# Patient Record
Sex: FEMALE | Race: BLACK OR AFRICAN AMERICAN | NOT HISPANIC OR LATINO | URBAN - METROPOLITAN AREA
[De-identification: names, ages, dates, MRNs, and addresses within clinical notes are randomized per-mention and may not be internally consistent; named-entity substitution may affect disease eponyms.]

---

## 2018-04-01 ENCOUNTER — OUTPATIENT (OUTPATIENT)
Dept: OUTPATIENT SERVICES | Facility: HOSPITAL | Age: 38
LOS: 1 days | End: 2018-04-01
Payer: MEDICAID

## 2018-04-01 PROCEDURE — G9001: CPT

## 2018-04-12 ENCOUNTER — EMERGENCY (EMERGENCY)
Facility: HOSPITAL | Age: 38
LOS: 1 days | Discharge: HOME | End: 2018-04-12
Attending: PERSONAL EMERGENCY RESPONSE ATTENDANT | Admitting: PERSONAL EMERGENCY RESPONSE ATTENDANT
Payer: MEDICAID

## 2018-04-12 VITALS
OXYGEN SATURATION: 100 % | TEMPERATURE: 98 F | HEART RATE: 86 BPM | RESPIRATION RATE: 18 BRPM | DIASTOLIC BLOOD PRESSURE: 90 MMHG | WEIGHT: 195.11 LBS | SYSTOLIC BLOOD PRESSURE: 133 MMHG

## 2018-04-12 PROCEDURE — 99283 EMERGENCY DEPT VISIT LOW MDM: CPT | Mod: 25

## 2018-04-12 NOTE — ED ADULT NURSE NOTE - OBJECTIVE STATEMENT
pt c/o intermittent rt knee pain x 2 months- no injury, also mild rt mid back pain. all is worse with weight bearing. mild tenderness to rt lateral knee

## 2018-04-13 DIAGNOSIS — R69 ILLNESS, UNSPECIFIED: ICD-10-CM

## 2018-04-13 PROBLEM — Z00.00 ENCOUNTER FOR PREVENTIVE HEALTH EXAMINATION: Status: ACTIVE | Noted: 2018-04-13

## 2018-04-13 PROCEDURE — 73564 X-RAY EXAM KNEE 4 OR MORE: CPT | Mod: 26,RT

## 2018-04-13 PROCEDURE — 99283 EMERGENCY DEPT VISIT LOW MDM: CPT

## 2018-04-13 PROCEDURE — 73564 X-RAY EXAM KNEE 4 OR MORE: CPT

## 2018-04-13 RX ORDER — IBUPROFEN 200 MG
600 TABLET ORAL ONCE
Qty: 0 | Refills: 0 | Status: COMPLETED | OUTPATIENT
Start: 2018-04-13 | End: 2018-04-13

## 2018-04-13 RX ADMIN — Medication 600 MILLIGRAM(S): at 01:11

## 2018-04-13 NOTE — ED PROCEDURE NOTE - ATTENDING CONTRIBUTION TO CARE
I supervised this procedure.  Knee immobilizer fitted for comfort.  Remains nvsc intact distal to site following placement.  Offered crutches but able to comfortably ambulate without crutches.

## 2018-04-13 NOTE — ED PROVIDER NOTE - ATTENDING CONTRIBUTION TO CARE
Attending PT is a 37 yr old female with complaitn of intermittent R knee pain x 2 mos. She states that pain is worsening and makes weight-bearing uncomfortbale.  NO acute injury.  No acute trauma.  NO inciting event.  No orthopedist currently. PT took naproxen at 3 pm today.  R knee tender over lateral joint line.  Unable to range 2/2 pain.  L knee unremarkable.  R knee with mild laxity on valgus/varus stress.  PT endorses some mild R lumbar back pain as well.  NO midline spinal TTP, no stepoffs.

## 2018-04-13 NOTE — ED PROVIDER NOTE - PLAN OF CARE
1. Follow up with ortho this week.  Referral list provided.   2. Rest, ice and elevate knee.  Ambulate as tolerated with use of crutches.  Keep knee immobilizer on during the day.  Take Motrin 600mg every 8 hrs for pain with food.  3. Worsening pain, swelling, weakness, numbness return to ER

## 2018-04-13 NOTE — ED PROVIDER NOTE - OBJECTIVE STATEMENT
36 Yo female no Pmhx presents to ED c/o right knee pain intermittent x 1 year. Pt notes worsening right knee pain in the past week, with inability to bear weight. Has tried naproxen, last took at 3pm today with minor relief. Endorses right lumbar back pain as well with. Denies trauma,  fever, erythema, swelling. Does not have orthopedist.

## 2018-04-13 NOTE — ED PROVIDER NOTE - PROGRESS NOTE DETAILS
Attending MD Charles.  Pt evaluated in ED.  MIld joint laxity on varus/valgus stress.  ED attending review (wet read entered) without joint injury/bony injury. No acute injury. No effusion.  Remains nvsc intact distal to site.  Able to ambulate without crutches with knee immobilizer in place.  Pt given information for follow-up with orthopedics for outpt management.  Verbalizes understanding of recommendations to utilize tylenol/motrin, ice, rest, elevation and knee immobilizer for additional comfort.  Return to ED for fevers/chills/worsened pain/knee swelling or erythema.

## 2018-04-13 NOTE — ED PROVIDER NOTE - LOWER EXTREMITY EXAM, RIGHT
no bruising, clicks, crepitus, deformity or effusion, ligmaentous instability, swelling noted on exam. TTP lateral joint line. unable to range knee fully secondary to pain.

## 2018-07-30 ENCOUNTER — EMERGENCY (EMERGENCY)
Facility: HOSPITAL | Age: 38
LOS: 0 days | Discharge: HOME | End: 2018-07-31
Attending: EMERGENCY MEDICINE
Payer: COMMERCIAL

## 2018-07-30 VITALS
OXYGEN SATURATION: 100 % | SYSTOLIC BLOOD PRESSURE: 141 MMHG | HEIGHT: 66 IN | WEIGHT: 199.96 LBS | HEART RATE: 86 BPM | TEMPERATURE: 99 F | DIASTOLIC BLOOD PRESSURE: 93 MMHG | RESPIRATION RATE: 18 BRPM

## 2018-07-30 DIAGNOSIS — M25.561 PAIN IN RIGHT KNEE: ICD-10-CM

## 2018-07-30 DIAGNOSIS — R42 DIZZINESS AND GIDDINESS: ICD-10-CM

## 2018-07-30 DIAGNOSIS — S83.91XA SPRAIN OF UNSPECIFIED SITE OF RIGHT KNEE, INITIAL ENCOUNTER: ICD-10-CM

## 2018-07-30 DIAGNOSIS — R51 HEADACHE: ICD-10-CM

## 2018-07-30 PROCEDURE — 99284 EMERGENCY DEPT VISIT MOD MDM: CPT

## 2018-07-30 RX ORDER — KETOROLAC TROMETHAMINE 30 MG/ML
30 SYRINGE (ML) INJECTION ONCE
Qty: 0 | Refills: 0 | Status: DISCONTINUED | OUTPATIENT
Start: 2018-07-30 | End: 2018-07-30

## 2018-07-30 NOTE — ED PROVIDER NOTE - OBJECTIVE STATEMENT
37 year old female with PMH of pinched nerve presenting to ED due to R knee pain on and off x 1 month and states she feels dizzy with headache on and off as well. Denies any focal weakness/numbness. Dizziness is not like a vertigo.

## 2018-07-30 NOTE — ED ADULT NURSE NOTE - OBJECTIVE STATEMENT
pt a&o x3, pt c/o of ongoing right knee pain 10/10. Pain worsening on extension and standing, pain described as tightness/numbness. Ice applied at this time by this nurse and leg elevated. Slight edema to right knee in comparison to left knee. PMH pinched nerve and bulging disc in back.

## 2018-07-30 NOTE — ED PROVIDER NOTE - MUSCULOSKELETAL, MLM
Spine appears normal, range of motion is not limited, R knee ROM intact, mild tenderness mediosuperior aspect

## 2018-07-30 NOTE — ED PROVIDER NOTE - NEUROLOGICAL, MLM
Alert and oriented, no focal deficits, no motor or sensory deficits. CN II - XII intact, no deficits

## 2018-07-30 NOTE — ED PROVIDER NOTE - MEDICAL DECISION MAKING DETAILS
pt with dizziness noted, headache on and off may follow up neuro if symptoms continue, knee pain x 1 month to dc with ortho follow up.

## 2018-07-30 NOTE — ED ADULT NURSE NOTE - NS ED NOTE ABUSE SUSPICION NEGLECT YN
[facility]    [id number]    [facility] [address]    Phone:  [phone]    Fax:  [phone]       Thank You for choosing us for your health care visit. We are glad to serve you and happy to provide you with this summary of your visit. Please help us to ensure we have accurate records. If you find anything that needs to be changed, please let our staff know as soon as possible.          Your Demographic Information     Patient Name Sex Gretta Rowley Female 1964       Ethnic Group Patient Race    Not of  or  Origin White      Your Visit Details     Date & Time Provider Department    2017 3:40 PM MIKEY Marino [facility] Med      We Ordered or Performed the Following     AEROSOL INHALATION ACUTE       Conditions Discussed Today or Order-Related Diagnoses        Comments    Bronchitis    -  Primary     Wheezing symptom           Your Vitals Were     BP Pulse Temp Resp Height Weight    148/54 (BP Location: RUE, Patient Position: Sitting, Cuff Size: Regular) 84 99.7 °F (37.6 °C) 20 5' 4\" (1.626 m) 186 lb 14.4 oz (84.8 kg)    SpO2 BMI Smoking Status             90% 32.08 kg/m2 Former Smoker         Medications Prescribed or Re-Ordered Today     predniSONE (DELTASONE) 20 MG tablet    Sig - Route: Take 2 tablets by mouth daily. - Oral    Class: Eprescribe    Pharmacy: [facility] [address] [place] [address] [address]  [address] Ph #: [phone]    azithromycin (ZITHROMAX Z-STANLEY) 250 MG tablet    Si tablets day one, then 1 tablet days 2-5    Class: Eprescribe    Pharmacy: [facility] [address] [place] [address] [address]  AT [address] [facility] Ph #: [phone]      Your Current Medications Are        Disp Refills Start End    BELATACEPT IV        Sig - Route: Indications: done monthly at [facility] - Intravenous    Class: Historical Med    mycophenolate (MYFORTIC)  360 MG DR tablet        Sig - Route: Take 720 mg by mouth 2 times daily. 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    Class: Eprescribe    insulin glargine (LANTUS) 100 UNIT/ML injection 10 mL 12 11/3/2016     Sig - Route: Inject 18 Units into the skin daily. - Subcutaneous    Class: Eprescribe    Notes to Pharmacy: Due for office visit and labs.    atorvastatin (LIPITOR) 80 MG tablet 45 tablet 4 2016     Sig: One-half tablet daily    Class: Eprescribe    aspirin (ECOTRIN) 81 MG EC tablet        Sig - Route: Take 81 mg by mouth daily. - Oral    Class: Historical Med    furosemide (LASIX) 80 MG tablet        Sig - Route: Take 80 mg by mouth 2 times daily. - Oral    Class: Historical Med    calcitRIOL (ROCALTROL) 0.25 MCG capsule        Sig - Route: Take 0.25 mcg by mouth every other day. Takes with supper - Oral    Class: Historical Med    cholecalciferol (VITAMIN D3) 1000 UNITS tablet        Sig - Route: Take 2,000 Units by mouth daily. - Oral    Class: Historical Med    docusate sodium (COLACE) 100 MG capsule        Sig - Route: Take 200 mg by mouth daily before dinner. - Oral    Class: Historical Med    nitroGLYcerin (NITROSTAT) 0.4 MG SL tablet        Sig - Route: Place 0.4 mg under the tongue every 5 minutes as needed. - Sublingual    Class: Historical Med    acetaminophen (TYLENOL) 325 MG tablet        Sig - Route: Take 650 mg by mouth every 4 hours as needed. Indications: Fever, Pain - Oral    Class: Historical Med    predniSONE (DELTASONE) 20 MG tablet 8 tablet 0 2017     Sig - Route: Take 2 tablets by mouth daily. - Oral    Class: Eprescribe    azithromycin (ZITHROMAX Z-STANLEY) 250 MG tablet 6 tablet 0 2017    Si tablets day one, then 1 tablet days 2-5     Class: Eprescribe    Iron-Vitamin C (VITRON-C)  MG TABS        Sig - Route: Take 2 tablets by mouth daily. Indications: iron - Oral    Class: Historical Med      Discontinued Medications        Reason for Discontinue    atropine (ISOPTO ATROPINE) 1 % ophthalmic solution Therapy Completed    gentamicin (GARAMYCIN) 0.1 % cream Therapy Completed    HYDROcodone-acetaminophen (NORCO) 5-325 MG per tablet Therapy Completed    moxifloxacin (VIGAMOX) 0.5 % ophthalmic solution Therapy Completed    Ophthalmic Irrigation Solution (RA STERILE EYE WASH) SOLN Therapy Completed    PERITONEAL DIALYSIS SOLUTIONS IP Therapy Completed    prednisoLONE acetate (PRED FORTE) 1 % ophthalmic suspension Therapy Completed    pregabalin (LYRICA) 25 MG capsule Therapy Completed    prochlorperazine (COMPAZINE) 25 MG suppository Therapy Completed    sevelamer carbonate (RENVELA) 800 MG tablet Therapy Completed      Allergies     Tape [Adhesive] HIVES, RASH    Able to use paper tape    Augmentin [Amoclan] HIVES, RASH      Immunizations History as of 4/6/2017     Name Date    Influenza 11/9/2016, 10/20/2014, 10/30/2013, 10/4/2011, 10/7/2010, 9/30/2009, 10/30/2008, 10/30/2007, 10/25/2006, 11/15/2005, 11/4/2004, 11/6/2003, 11/26/2001    MMR 4/2/1979    Pneumococcal Polysaccharide Adult 11/26/2001      Medications Administered Today        Admin Date Action Route                albuterol-ipratropium 2.5 mg/0.5 mg (DUONEB) nebulizer solution 3 mL 04/06/2017 Given Nebulization                 Patient Portal Signup     Manage health care for you and your family anytime, anywhere with the new [facility], your free online resource for quick and easy access to personal health information, scheduling appointments, refilling prescriptions, viewing test results, paying bills and more.  Sign up for a free and secure account. Please follow the instructions below to securely complete your enrollment.     1. Go to [email or web address]  2. Click Sign  Up Now   3. Enter the Activation Code when prompted     Activation Code: [email or web address]  Expires: 5/6/2017  4:22 PM    If you have questions related to [facility], you can email [email or web address] or call [phone] to talk to our [facility] staff.  For questions related to your health, contact your physician’s office.  Please remember to dial 911 for medical emergencies.              Patient Instructions     None       No

## 2018-07-31 VITALS
SYSTOLIC BLOOD PRESSURE: 120 MMHG | HEART RATE: 76 BPM | OXYGEN SATURATION: 99 % | DIASTOLIC BLOOD PRESSURE: 68 MMHG | RESPIRATION RATE: 19 BRPM | TEMPERATURE: 98 F

## 2018-07-31 LAB
ALBUMIN SERPL ELPH-MCNC: 3.5 G/DL — SIGNIFICANT CHANGE UP (ref 3.3–5)
ALP SERPL-CCNC: 62 U/L — SIGNIFICANT CHANGE UP (ref 40–120)
ALT FLD-CCNC: 50 U/L — SIGNIFICANT CHANGE UP (ref 12–78)
ANION GAP SERPL CALC-SCNC: 9 MMOL/L — SIGNIFICANT CHANGE UP (ref 5–17)
AST SERPL-CCNC: 29 U/L — SIGNIFICANT CHANGE UP (ref 15–37)
BASOPHILS # BLD AUTO: 0.03 K/UL — SIGNIFICANT CHANGE UP (ref 0–0.2)
BASOPHILS NFR BLD AUTO: 0.3 % — SIGNIFICANT CHANGE UP (ref 0–2)
BILIRUB SERPL-MCNC: 0.7 MG/DL — SIGNIFICANT CHANGE UP (ref 0.2–1.2)
BUN SERPL-MCNC: 17 MG/DL — SIGNIFICANT CHANGE UP (ref 7–23)
CALCIUM SERPL-MCNC: 8.5 MG/DL — SIGNIFICANT CHANGE UP (ref 8.5–10.1)
CHLORIDE SERPL-SCNC: 104 MMOL/L — SIGNIFICANT CHANGE UP (ref 96–108)
CO2 SERPL-SCNC: 27 MMOL/L — SIGNIFICANT CHANGE UP (ref 22–31)
CREAT SERPL-MCNC: 0.74 MG/DL — SIGNIFICANT CHANGE UP (ref 0.5–1.3)
EOSINOPHIL # BLD AUTO: 0.11 K/UL — SIGNIFICANT CHANGE UP (ref 0–0.5)
EOSINOPHIL NFR BLD AUTO: 1 % — SIGNIFICANT CHANGE UP (ref 0–6)
GLUCOSE SERPL-MCNC: 96 MG/DL — SIGNIFICANT CHANGE UP (ref 70–99)
HCG SERPL-ACNC: <1 MIU/ML — SIGNIFICANT CHANGE UP
HCT VFR BLD CALC: 34.8 % — SIGNIFICANT CHANGE UP (ref 34.5–45)
HGB BLD-MCNC: 11.2 G/DL — LOW (ref 11.5–15.5)
IMM GRANULOCYTES NFR BLD AUTO: 0.3 % — SIGNIFICANT CHANGE UP (ref 0–1.5)
LYMPHOCYTES # BLD AUTO: 3.39 K/UL — HIGH (ref 1–3.3)
LYMPHOCYTES # BLD AUTO: 31 % — SIGNIFICANT CHANGE UP (ref 13–44)
MCHC RBC-ENTMCNC: 28.9 PG — SIGNIFICANT CHANGE UP (ref 27–34)
MCHC RBC-ENTMCNC: 32.2 GM/DL — SIGNIFICANT CHANGE UP (ref 32–36)
MCV RBC AUTO: 89.9 FL — SIGNIFICANT CHANGE UP (ref 80–100)
MONOCYTES # BLD AUTO: 0.93 K/UL — HIGH (ref 0–0.9)
MONOCYTES NFR BLD AUTO: 8.5 % — SIGNIFICANT CHANGE UP (ref 2–14)
NEUTROPHILS # BLD AUTO: 6.44 K/UL — SIGNIFICANT CHANGE UP (ref 1.8–7.4)
NEUTROPHILS NFR BLD AUTO: 58.9 % — SIGNIFICANT CHANGE UP (ref 43–77)
NRBC # BLD: 0 /100 WBCS — SIGNIFICANT CHANGE UP (ref 0–0)
PLATELET # BLD AUTO: 341 K/UL — SIGNIFICANT CHANGE UP (ref 150–400)
POTASSIUM SERPL-MCNC: 4.3 MMOL/L — SIGNIFICANT CHANGE UP (ref 3.5–5.3)
POTASSIUM SERPL-SCNC: 4.3 MMOL/L — SIGNIFICANT CHANGE UP (ref 3.5–5.3)
PROT SERPL-MCNC: 7.7 GM/DL — SIGNIFICANT CHANGE UP (ref 6–8.3)
RBC # BLD: 3.87 M/UL — SIGNIFICANT CHANGE UP (ref 3.8–5.2)
RBC # FLD: 14.3 % — SIGNIFICANT CHANGE UP (ref 10.3–14.5)
SODIUM SERPL-SCNC: 140 MMOL/L — SIGNIFICANT CHANGE UP (ref 135–145)
WBC # BLD: 10.93 K/UL — HIGH (ref 3.8–10.5)
WBC # FLD AUTO: 10.93 K/UL — HIGH (ref 3.8–10.5)

## 2018-07-31 PROCEDURE — 70450 CT HEAD/BRAIN W/O DYE: CPT | Mod: 26

## 2018-07-31 PROCEDURE — 73564 X-RAY EXAM KNEE 4 OR MORE: CPT | Mod: 26,RT

## 2018-07-31 RX ADMIN — Medication 30 MILLIGRAM(S): at 00:05

## 2018-08-20 PROBLEM — M51.26 OTHER INTERVERTEBRAL DISC DISPLACEMENT, LUMBAR REGION: Chronic | Status: ACTIVE | Noted: 2018-07-30

## 2018-08-20 PROBLEM — G58.9 MONONEUROPATHY, UNSPECIFIED: Chronic | Status: ACTIVE | Noted: 2018-07-30

## 2018-08-24 ENCOUNTER — APPOINTMENT (OUTPATIENT)
Dept: ORTHOPEDIC SURGERY | Facility: CLINIC | Age: 38
End: 2018-08-24

## 2019-03-01 NOTE — ED PROVIDER NOTE - CARE PLAN
Principal Discharge DX:	Right knee pain  Assessment and plan of treatment:	1. Follow up with ortho this week.  Referral list provided.   2. Rest, ice and elevate knee.  Ambulate as tolerated with use of crutches.  Keep knee immobilizer on during the day.  Take Motrin 600mg every 8 hrs for pain with food.  3. Worsening pain, swelling, weakness, numbness return to ER
no

## 2019-05-17 ENCOUNTER — EMERGENCY (EMERGENCY)
Facility: HOSPITAL | Age: 39
LOS: 1 days | End: 2019-05-17
Attending: EMERGENCY MEDICINE
Payer: MEDICAID

## 2019-05-17 VITALS
HEIGHT: 66 IN | TEMPERATURE: 98 F | WEIGHT: 199.96 LBS | OXYGEN SATURATION: 100 % | SYSTOLIC BLOOD PRESSURE: 136 MMHG | HEART RATE: 82 BPM | RESPIRATION RATE: 18 BRPM | DIASTOLIC BLOOD PRESSURE: 84 MMHG

## 2019-05-17 VITALS
SYSTOLIC BLOOD PRESSURE: 106 MMHG | HEART RATE: 79 BPM | OXYGEN SATURATION: 98 % | TEMPERATURE: 99 F | DIASTOLIC BLOOD PRESSURE: 73 MMHG | RESPIRATION RATE: 17 BRPM

## 2019-05-17 LAB
ALBUMIN SERPL ELPH-MCNC: 4.1 G/DL — SIGNIFICANT CHANGE UP (ref 3.3–5)
ALP SERPL-CCNC: 53 U/L — SIGNIFICANT CHANGE UP (ref 40–120)
ALT FLD-CCNC: 15 U/L — SIGNIFICANT CHANGE UP (ref 10–45)
ANION GAP SERPL CALC-SCNC: 11 MMOL/L — SIGNIFICANT CHANGE UP (ref 5–17)
APPEARANCE UR: ABNORMAL
AST SERPL-CCNC: 16 U/L — SIGNIFICANT CHANGE UP (ref 10–40)
BACTERIA # UR AUTO: ABNORMAL
BASOPHILS # BLD AUTO: 0.1 K/UL — SIGNIFICANT CHANGE UP (ref 0–0.2)
BASOPHILS NFR BLD AUTO: 0.5 % — SIGNIFICANT CHANGE UP (ref 0–2)
BILIRUB SERPL-MCNC: 0.6 MG/DL — SIGNIFICANT CHANGE UP (ref 0.2–1.2)
BILIRUB UR-MCNC: NEGATIVE — SIGNIFICANT CHANGE UP
BUN SERPL-MCNC: 13 MG/DL — SIGNIFICANT CHANGE UP (ref 7–23)
CALCIUM SERPL-MCNC: 9.2 MG/DL — SIGNIFICANT CHANGE UP (ref 8.4–10.5)
CHLORIDE SERPL-SCNC: 102 MMOL/L — SIGNIFICANT CHANGE UP (ref 96–108)
CO2 SERPL-SCNC: 27 MMOL/L — SIGNIFICANT CHANGE UP (ref 22–31)
COLOR SPEC: ABNORMAL
CREAT SERPL-MCNC: 0.77 MG/DL — SIGNIFICANT CHANGE UP (ref 0.5–1.3)
DIFF PNL FLD: NEGATIVE — SIGNIFICANT CHANGE UP
EOSINOPHIL # BLD AUTO: 0.2 K/UL — SIGNIFICANT CHANGE UP (ref 0–0.5)
EOSINOPHIL NFR BLD AUTO: 1.5 % — SIGNIFICANT CHANGE UP (ref 0–6)
EPI CELLS # UR: 32 /HPF — HIGH
GLUCOSE SERPL-MCNC: 108 MG/DL — HIGH (ref 70–99)
GLUCOSE UR QL: NEGATIVE — SIGNIFICANT CHANGE UP
HCG SERPL-ACNC: <2 MIU/ML — SIGNIFICANT CHANGE UP
HCT VFR BLD CALC: 35.7 % — SIGNIFICANT CHANGE UP (ref 34.5–45)
HGB BLD-MCNC: 12 G/DL — SIGNIFICANT CHANGE UP (ref 11.5–15.5)
HYALINE CASTS # UR AUTO: 6 /LPF — HIGH (ref 0–2)
KETONES UR-MCNC: NEGATIVE — SIGNIFICANT CHANGE UP
LEUKOCYTE ESTERASE UR-ACNC: ABNORMAL
LYMPHOCYTES # BLD AUTO: 3.6 K/UL — HIGH (ref 1–3.3)
LYMPHOCYTES # BLD AUTO: 34.4 % — SIGNIFICANT CHANGE UP (ref 13–44)
MCHC RBC-ENTMCNC: 30.9 PG — SIGNIFICANT CHANGE UP (ref 27–34)
MCHC RBC-ENTMCNC: 33.6 GM/DL — SIGNIFICANT CHANGE UP (ref 32–36)
MCV RBC AUTO: 91.8 FL — SIGNIFICANT CHANGE UP (ref 80–100)
MONOCYTES # BLD AUTO: 1 K/UL — HIGH (ref 0–0.9)
MONOCYTES NFR BLD AUTO: 9.9 % — SIGNIFICANT CHANGE UP (ref 2–14)
NEUTROPHILS # BLD AUTO: 5.5 K/UL — SIGNIFICANT CHANGE UP (ref 1.8–7.4)
NEUTROPHILS NFR BLD AUTO: 53.6 % — SIGNIFICANT CHANGE UP (ref 43–77)
NITRITE UR-MCNC: NEGATIVE — SIGNIFICANT CHANGE UP
PH UR: 7 — SIGNIFICANT CHANGE UP (ref 5–8)
PLATELET # BLD AUTO: 343 K/UL — SIGNIFICANT CHANGE UP (ref 150–400)
POTASSIUM SERPL-MCNC: 4.2 MMOL/L — SIGNIFICANT CHANGE UP (ref 3.5–5.3)
POTASSIUM SERPL-SCNC: 4.2 MMOL/L — SIGNIFICANT CHANGE UP (ref 3.5–5.3)
PROT SERPL-MCNC: 7.4 G/DL — SIGNIFICANT CHANGE UP (ref 6–8.3)
PROT UR-MCNC: ABNORMAL
RBC # BLD: 3.88 M/UL — SIGNIFICANT CHANGE UP (ref 3.8–5.2)
RBC # FLD: 12.3 % — SIGNIFICANT CHANGE UP (ref 10.3–14.5)
RBC CASTS # UR COMP ASSIST: 3 /HPF — SIGNIFICANT CHANGE UP (ref 0–4)
SODIUM SERPL-SCNC: 140 MMOL/L — SIGNIFICANT CHANGE UP (ref 135–145)
SP GR SPEC: 1.02 — SIGNIFICANT CHANGE UP (ref 1.01–1.02)
TSH SERPL-MCNC: 2.56 UIU/ML — SIGNIFICANT CHANGE UP (ref 0.27–4.2)
UROBILINOGEN FLD QL: ABNORMAL
WBC # BLD: 10.3 K/UL — SIGNIFICANT CHANGE UP (ref 3.8–10.5)
WBC # FLD AUTO: 10.3 K/UL — SIGNIFICANT CHANGE UP (ref 3.8–10.5)
WBC UR QL: 57 /HPF — HIGH (ref 0–5)

## 2019-05-17 PROCEDURE — 84702 CHORIONIC GONADOTROPIN TEST: CPT

## 2019-05-17 PROCEDURE — 70450 CT HEAD/BRAIN W/O DYE: CPT | Mod: 26

## 2019-05-17 PROCEDURE — 96366 THER/PROPH/DIAG IV INF ADDON: CPT

## 2019-05-17 PROCEDURE — 70553 MRI BRAIN STEM W/O & W/DYE: CPT

## 2019-05-17 PROCEDURE — 96365 THER/PROPH/DIAG IV INF INIT: CPT | Mod: XU

## 2019-05-17 PROCEDURE — 72156 MRI NECK SPINE W/O & W/DYE: CPT | Mod: 26

## 2019-05-17 PROCEDURE — 99284 EMERGENCY DEPT VISIT MOD MDM: CPT | Mod: 25

## 2019-05-17 PROCEDURE — 99236 HOSP IP/OBS SAME DATE HI 85: CPT

## 2019-05-17 PROCEDURE — 96375 TX/PRO/DX INJ NEW DRUG ADDON: CPT

## 2019-05-17 PROCEDURE — 81001 URINALYSIS AUTO W/SCOPE: CPT

## 2019-05-17 PROCEDURE — 84443 ASSAY THYROID STIM HORMONE: CPT

## 2019-05-17 PROCEDURE — G0378: CPT

## 2019-05-17 PROCEDURE — 72156 MRI NECK SPINE W/O & W/DYE: CPT

## 2019-05-17 PROCEDURE — 80307 DRUG TEST PRSMV CHEM ANLYZR: CPT

## 2019-05-17 PROCEDURE — 80053 COMPREHEN METABOLIC PANEL: CPT

## 2019-05-17 PROCEDURE — 85027 COMPLETE CBC AUTOMATED: CPT

## 2019-05-17 PROCEDURE — 99283 EMERGENCY DEPT VISIT LOW MDM: CPT

## 2019-05-17 PROCEDURE — 87086 URINE CULTURE/COLONY COUNT: CPT

## 2019-05-17 PROCEDURE — 70553 MRI BRAIN STEM W/O & W/DYE: CPT | Mod: 26

## 2019-05-17 PROCEDURE — A9585: CPT

## 2019-05-17 PROCEDURE — 70450 CT HEAD/BRAIN W/O DYE: CPT

## 2019-05-17 RX ORDER — METOCLOPRAMIDE HCL 10 MG
10 TABLET ORAL ONCE
Refills: 0 | Status: COMPLETED | OUTPATIENT
Start: 2019-05-17 | End: 2019-05-17

## 2019-05-17 RX ORDER — NITROFURANTOIN MACROCRYSTAL 50 MG
100 CAPSULE ORAL
Refills: 0 | Status: DISCONTINUED | OUTPATIENT
Start: 2019-05-17 | End: 2019-05-17

## 2019-05-17 RX ORDER — SODIUM CHLORIDE 9 MG/ML
1000 INJECTION INTRAMUSCULAR; INTRAVENOUS; SUBCUTANEOUS ONCE
Refills: 0 | Status: COMPLETED | OUTPATIENT
Start: 2019-05-17 | End: 2019-05-17

## 2019-05-17 RX ORDER — KETOROLAC TROMETHAMINE 30 MG/ML
15 SYRINGE (ML) INJECTION ONCE
Refills: 0 | Status: DISCONTINUED | OUTPATIENT
Start: 2019-05-17 | End: 2019-05-17

## 2019-05-17 RX ORDER — NITROFURANTOIN MACROCRYSTAL 50 MG
1 CAPSULE ORAL
Qty: 20 | Refills: 0
Start: 2019-05-17 | End: 2019-05-26

## 2019-05-17 RX ORDER — CEFTRIAXONE 500 MG/1
1 INJECTION, POWDER, FOR SOLUTION INTRAMUSCULAR; INTRAVENOUS ONCE
Refills: 0 | Status: COMPLETED | OUTPATIENT
Start: 2019-05-17 | End: 2019-05-17

## 2019-05-17 RX ADMIN — CEFTRIAXONE 1 GRAM(S): 500 INJECTION, POWDER, FOR SOLUTION INTRAMUSCULAR; INTRAVENOUS at 06:46

## 2019-05-17 RX ADMIN — Medication 15 MILLIGRAM(S): at 02:41

## 2019-05-17 RX ADMIN — Medication 10 MILLIGRAM(S): at 02:05

## 2019-05-17 RX ADMIN — CEFTRIAXONE 100 GRAM(S): 500 INJECTION, POWDER, FOR SOLUTION INTRAMUSCULAR; INTRAVENOUS at 03:47

## 2019-05-17 RX ADMIN — SODIUM CHLORIDE 1000 MILLILITER(S): 9 INJECTION INTRAMUSCULAR; INTRAVENOUS; SUBCUTANEOUS at 02:05

## 2019-05-17 NOTE — CONSULT NOTE ADULT - SUBJECTIVE AND OBJECTIVE BOX
Neurology  Consult Note  05-17-19    Name:  VIRGINIA GUZMÁN; 38y (1980)    Reason for Admission:     Chief Complaint:  Right hemisensory changes    HPI:  39yo AA woman with a PMHx of lumbar disc herniations, presents with complaints of sensory changes. Saturday 5/11, patient reports numbness of LUE. Lasting <1hr. Resolved. Sunday, patient states the numbness now present over RUE and RLE. Describes the sensory change as occasionally numbness vs. paresthesia. States episodes last <1hr, but can occur >10x/ day. Nothing makes it better or worse. Patient describes some weakness with the sensory loss, stating that its difficult to hold thing in the affected hand during the episode. Patient currently reports that she is at baseline. Patient denies fevers, chills, ns, cp, sob, abd pain, n/v/d/c.  In the ED, VSS, labs grossly wnl, CTH shows NAD.     Review of Systems:  As states in HPI.      PMHx:   Bulge of lumbar disc without myelopathy  Pinched nerve  Anal Fissure    PFHx:   No pertinent family history in first degree relatives    PSuHx:   No significant past surgical history    Medications:  MEDICATIONS  (STANDING):    MEDICATIONS  (PRN):    Vitals:  T(C): 36.4 (05-17-19 @ 00:54), Max: 36.4 (05-17-19 @ 00:54)  HR: 75 (05-17-19 @ 06:22) (75 - 82)  BP: 126/90 (05-17-19 @ 06:22) (126/90 - 136/84)  RR: 18 (05-17-19 @ 06:22) (18 - 18)  SpO2: 100% (05-17-19 @ 06:22) (100% - 100%)    Labs:                        12.0   10.3  )-----------( 343      ( 17 May 2019 02:01 )             35.7     05-17    140  |  102  |  13  ----------------------------<  108<H>  4.2   |  27  |  0.77    Ca    9.2      17 May 2019 02:01    TPro  7.4  /  Alb  4.1  /  TBili  0.6  /  DBili  x   /  AST  16  /  ALT  15  /  AlkPhos  53  05-17    CAPILLARY BLOOD GLUCOSE  LIVER FUNCTIONS - ( 17 May 2019 02:01 )  Alb: 4.1 g/dL / Pro: 7.4 g/dL / ALK PHOS: 53 U/L / ALT: 15 U/L / AST: 16 U/L / GGT: x           PHYSICAL EXAMINATION:  General: Well-developed, well nourished, in no acute distress.  Eyes: Conjunctiva and sclera clear.  Neurologic:  - Mental Status:  Alert, awake, oriented to person, place, and time; Speech is fluent with intact naming, repetition, and comprehension; Good overall fund of knowledge  - Cranial Nerves II-XII:    II:  Visual fields are full to confrontation; Pupils are equal, round, and reactive to light;   III, IV, VI:  Extraocular movements are intact without nystagmus.  V:  Facial sensation is intact in the V1-V3 distribution bilaterally.  VII:  Face is symmetric with normal eye closure and smile  VIII:  Hearing is intact to finger rub.  IX, X:  Uvula is midline and soft palate rises symmetrically  XI:  Head turning and shoulder shrug are intact.  XII:  Tongue protudes in the midline.  - Motor:  Strength is 5/5 throughout.  There is no pronator drift.  Normal muscle bulk and tone throughout.  - Reflexes:  2+ and symmetric at the biceps, triceps, brachioradialis, knees, and ankles.  Plantar responses flexor.  - Sensory:  Patchy sensory loss over lateral RLE and lateral RUE throughout extremity to sharp stimuli.  - Coordination:  Finger-nose-finger and heel-knee-shin intact without dysmetria.  Rapid alternating hand movements intact.      Radiology:  < from: CT Head No Cont (05.17.19 @ 02:03) >  IMPRESSION:   No CT evidence of acute intracranial hemorrhage, brain edema, or mass   effect.  No significant interval change compared to the previous CT of July 31, 2018.    FRANSICO VILLA M.D., RADIOLOGY RESIDENT  This document has been electronically signed.  SOL HARMAN M.D, ATTENDING RADIOLOGIST  This document has been electronically signed. May 17 2019  2:28AM  < end of copied text >

## 2019-05-17 NOTE — ED CDU PROVIDER INITIAL DAY NOTE - DETAILS
- frequent re-eval  - vitals q 4hrs  - neurochecks q 4hrs  - MRI head and c and t spine  - neuro following  - case discussed with attending Dr. Velazquez

## 2019-05-17 NOTE — ED ADULT TRIAGE NOTE - CHIEF COMPLAINT QUOTE
headache, numbness/tingling to right hand, fingers, right leg and toes since Monday, intermittent blurry vision

## 2019-05-17 NOTE — ED ADULT NURSE REASSESSMENT NOTE - NS ED NURSE REASSESS COMMENT FT1
MD Henley called neuro team at 0525 to see if they saw pt and was told "we're on our way to see her now." Still waiting for neuro consult.

## 2019-05-17 NOTE — ED ADULT NURSE NOTE - OBJECTIVE STATEMENT
37 y/o female PMH lumbar disc bulge presents to ED, pinched nerve, anal fissure presents to ED c/o headache, numbness/tingling to R hand, fingers, R leg and toes since Monday. She states that she has intermittent blurry vision. 37 y/o female PMH lumbar disc bulge presents to ED, pinched nerve, anal fissure presents to ED c/o headache, numbness/tingling to R hand, fingers, R leg and toes since Monday. She states that she has intermittent blurry vision. Patient is A&Ox4. Face is symmetrical. PERRL 3mmB. Speech is clear. Patient is moving all extremities with 5/5 strength and walks with steady gait.

## 2019-05-17 NOTE — ED CDU PROVIDER DISPOSITION NOTE - CLINICAL COURSE
37y/o F with PMH lumbar disc bulge and pinched nerve c/o paresthesias x 4 days. pt states 1 week ago she noted numbness and tingling of her L arm and leg, and mild weakness in the L leg. Pt states she started feeling this numbness on her left since last saturday when she was at rest.  then on Sunday (5/13/19) she got a shooting pain sensation in her R arm. Then paresthesias transitioned from LUE and LLE to RUE and RLE. This sensation has persisted and had gotten worse in the following days. Pt also reports headache in the back of her head around the same time, more so on the right, that spreads down her neck. She states she normally doesn't get headaches. Nothing really improved her pain, weakness, or numbnesss. Pt went to her PCP yesterday to get a cervical x-ray, but haven't yet heard of the results. Reports no heavy lifting, or trauma recently. Never had this sensation before. Denies any fevers, chills, no N/D, some minor nausea. No travel hx, OCP usage, sick contacts, recent immobilization or hospitalization. No blood disorder runs in the family. Further denies rashes, dysphagia, confusion, photophobia.  In ED, labs unremarkable aside from + UA, CTH negative, neuro saw pt recommended CDU for MRI head, c and t spine w/ and w/o contrast. 39y/o F with PMH lumbar disc bulge and pinched nerve c/o paresthesias x 4 days. pt states 1 week ago she noted numbness and tingling of her L arm and leg, and mild weakness in the L leg. Pt states she started feeling this numbness on her left since last saturday when she was at rest.  then on Sunday (5/13/19) she got a shooting pain sensation in her R arm. Then paresthesias transitioned from LUE and LLE to RUE and RLE. This sensation has persisted and had gotten worse in the following days. Pt also reports headache in the back of her head around the same time, more so on the right, that spreads down her neck. She states she normally doesn't get headaches. Nothing really improved her pain, weakness, or numbnesss. Pt went to her PCP yesterday to get a cervical x-ray, but haven't yet heard of the results. Reports no heavy lifting, or trauma recently. Never had this sensation before. Denies any fevers, chills, no N/D, some minor nausea. No travel hx, OCP usage, sick contacts, recent immobilization or hospitalization. No blood disorder runs in the family. Further denies rashes, dysphagia, confusion, photophobia.  In ED, labs unremarkable aside from + UA, CTH negative, neuro saw pt recommended CDU for MRI head, c and t spine w/ and w/o contrast.  pt seen and evaluated by Neuro. MRI Brain and Cspine w and w.o contrast with No acute pathology. Neuro recommendation to follow up for outpt emg and neurophysiology for further workup. pt understands the plan and discharge instructions.  Case discussed with Dr. Celis.

## 2019-05-17 NOTE — ED PROVIDER NOTE - PHYSICAL EXAMINATION
General: No acute distress.  HEENT: NCAT.  PERRL.  EOMI.  No scleral icterus or injection.  Moist MM.      Neck: Supple.  Full ROM.  No JVD.  No thyromegaly.   Heart: RRR.  Normal S1 and S2.  No murmurs, rubs, or gallops.   Lungs: CTAB. No wheezes, crackles, or rhonchi.    Abdomen: BS+, soft, NT/ND.  No organomegaly.  Skin: Warm and dry.  No rashes.  Extremities: No edema, clubbing, or cyanosis.  2+ peripheral pulses b/l.  Neuro: A&Ox3.  CN II-XII intact. mildly decreased sensation/numbness in the left side of the face, forehead, R side unaffected. 5/5 strength in UE b/l and 4/5 strength LLE, and 5/5 strength RLE.  Tactile sensation intact in UE and LE b/l, with mild numbness in the LUE.

## 2019-05-17 NOTE — ED PROVIDER NOTE - CHIEF COMPLAINT
The patient is a 38y Female complaining of The patient is a 38y Female complaining of numbness/tingling sensation.

## 2019-05-17 NOTE — ED CDU PROVIDER DISPOSITION NOTE - ATTENDING CONTRIBUTION TO CARE
Patient in CDU for evaluation of headaches and parasthesias, MRI head and spine in CDU negative for emergent pathology, neurology recommending outpatient EMG and further workup, patient amenable to plan for discharge.  Jerrell Celis M.D.

## 2019-05-17 NOTE — ED PROVIDER NOTE - CLINICAL SUMMARY MEDICAL DECISION MAKING FREE TEXT BOX
Otherwise healthy 38 y F no pmh c 5 days gradual onset intermittent occipital HA a/w L paresthesias progressing to RUE (hand) and RLE (toes) "numbness and tingling".  3-4 episodes/day, nonexertional, no n/v, ams, f/c, neck pain/stiffness.  No h/o migraines.  No modifying factors.  Took nsaids several days ago with minimal improvement.  Neuro exam demonstrates CN V1-3 decreased sensation on L and patchy decrease in sensation to RUE not following dermatome and RLE at plantar aspect of foot, also c weakness c hip flexion (4/5) on R.  Normal gait/cerebellar exam.  Exam seems c/w atypical migraine, not c/w meningitis/SAH/CVST/CVA.  Will treat HA, give fluids, check labs and CT head, likely obtain neuro c/s.  Reassess.  --BMM Otherwise healthy 38 y F no pmh c 5 days gradual onset intermittent occipital HA a/w L paresthesias progressing to RUE (hand) and RLE (toes) "numbness and tingling".  3-4 episodes/day, nonexertional, no n/v, ams, f/c, neck pain/stiffness.  No h/o migraines.  No modifying factors.  Took nsaids several days ago with minimal improvement.  Neuro exam demonstrates CN V1-3 decreased sensation on L and patchy decrease in sensation to RUE not following dermatome and RLE at plantar aspect of foot, also c weakness c hip flexion (4/5) on R.  Normal gait/cerebellar exam.  Exam seems c/w atypical migraine, not c/w meningitis/SAH/CVST/CVA.  Demyelinating process possible but less likely given presence of HA and short course of symptoms.  Will treat HA, give fluids, check labs and CT head, likely obtain neuro c/s.  Reassess.  --BMM

## 2019-05-17 NOTE — ED PROVIDER NOTE - PROGRESS NOTE DETAILS
Improved symptoms after treatment.  Seen by neurology resident.  Concern for demyelinating process given patchy paresthesias that are improved but persistent.  Recommending MRI brain/c-spine/t-spine.  Will admit to cdu.  --BMM

## 2019-05-17 NOTE — ED CDU PROVIDER INITIAL DAY NOTE - OBJECTIVE STATEMENT
37y/o F with PMH lumbar disc bulge and pinched nerve c/o paresthesias x 4 days. pt states 1 week ago she noted numbness and tingling of her L arm and leg, and mild weakness in the L leg. Pt states she started feeling this numbness on her left since last saturday when she was at rest.  then on Sunday (5/13/19) she got a shooting pain sensation in her R arm. Then paresthesias transitioned from LUE and LLE to RUE and RLE. This sensation has persisted and had gotten worse in the following days. Pt also reports headache in the back of her head around the same time, more so on the right, that spreads down her neck. She states she normally doesn't get headaches. Nothing really improved her pain, weakness, or numbnesss. Pt went to her PCP yesterday to get a cervical x-ray, but haven't yet heard of the results. Reports no heavy lifting, or trauma recently. Never had this sensation before. Denies any fevers, chills, no N/D, some minor nausea. No travel hx, OCP usage, sick contacts, recent immobilization or hospitalization. No blood disorder runs in the family. Further denies rashes, dysphagia, confusion, photophobia.  In ED, labs unremarkable, CTH negative, neuro saw pt recommended CDU for MRI head, c and t spine w/ and w/o contrast.

## 2019-05-17 NOTE — ED CDU PROVIDER DISPOSITION NOTE - NSFOLLOWUPINSTRUCTIONS_ED_ALL_ED_FT
1. Stay Hydrated. Take Macrobid as prescribed for urinary tract infection.   2. Drink plenty of fluids to stay hydrated.  3. You will need to follow up with your PMD and neurologist or our neurology clinic at [phone] in 2-3 days. A copy of your results were given to you to bring to your appt.   4. Return to ER for headache, confusion, behavior/speech changes, numbness/tingling/weakness in your arms/legs, or any other concerns. 1. Stay Hydrated. Take Macrobid as prescribed for urinary tract infection.   2. Drink plenty of fluids to stay hydrated.  3. You will need to follow up with your PMD and neurologist or our neurology clinic at [phone] or [phone] for outpt EMG in 2-3 days. A copy of your results were given to you to bring to your appt.   4. Return to ER for headache, confusion, behavior/speech changes, numbness/tingling/weakness in your arms/legs, or any other concerns.

## 2019-05-17 NOTE — ED ADULT NURSE REASSESSMENT NOTE - NS ED NURSE REASSESS COMMENT FT1
Report taken from Gladys RAMIREZ. States pt did well overnight. No complaints. Will continue to monitor.

## 2019-05-17 NOTE — CONSULT NOTE ADULT - ATTENDING COMMENTS
Ms. Castrejon is a 37yo AA woman with a PMHx of lumbar disc herniations who presented with sensory changes since Saturday 5/11.  She reports numbness of LUE. Lasting <1hr which initially resolved but then returned and now present over RUE and RLE. Describes the sensory change as occasionally numbness vs. paresthesia. States episodes wax and wane and nothing clearly makes it better or worse. She does describes some weakness with the sensory loss, stating that its difficult to hold thing in the affected hand during the episode (difficult to close.). Patient denies fevers, chills, ns, cp, sob, abd pain, n/v/d/c.    In the ED, VSS, labs grossly wnl, CTH shows NAD.   On exam pt with patchy sensory decrease in fingertips of right hand and into ? median and ulnar distribution.  Parasthesia into her right s1 distribution over right leg with decrease of her right achilles.    Impression:    Patchy right sensory loss r/o demyelinating lesion vs mononeuritis multiplex vs peripheral neuropathy.    MRI brain and C-spine without demyelination.    Plan:  - B12, Folate, Thiamine, TSH, Cu, A1C, Lipids  - MRI Brain, C/T spine without acute change.  May follow up outpatient with neurophysiology to consider for emg/ ncs in 3 weeks.  If symptoms worsen to f/u in ed sooner.

## 2019-05-17 NOTE — ED ADULT NURSE REASSESSMENT NOTE - NS ED NURSE REASSESS COMMENT FT1
Pt states she has mild improvement with headache. Is currently sleeping. Still waiting for neuro consult.

## 2019-05-17 NOTE — ED ADULT NURSE REASSESSMENT NOTE - NS ED NURSE REASSESS COMMENT FT1
Pt states that headache has improved though she still feels numbness/tingling down R arm. She says symptoms are intermittent and have improved since being here. Denying need for pain meds currently. Is awaiting transport to CDU.

## 2019-05-17 NOTE — ED CDU PROVIDER INITIAL DAY NOTE - DATE/TIME 1
Nursing Notes    Patient presents to the office today in follow-up. Patient rates her pain at 7/10 on the numerical pain scale. Reviewed medications with counts as follows:    Rx Date filled Qty Dispensed Pill Count Last Dose Short   Morphine 60 mg 02/22/18 90 30 today no   Percocet 60 2/20/18 60 26 today no                                POC UDS was performed in office today    Any new labs or imaging since last appointment? NO    Have you been to an emergency room (ER) or urgent care clinic since your last visit? NO            Have you been hospitalized since your last visit? NO     If yes, where, when, and reason for visit? Have you seen or consulted any other health care providers outside of the [address] [place] [address]  since your last visit? NO     If yes, where, when, and reason for visit? HM deferred to pcp. 17-May-2019 10:18

## 2019-05-17 NOTE — ED ADULT NURSE REASSESSMENT NOTE - NS ED NURSE REASSESS COMMENT FT1
Pt made aware of need for urine sample. Returned from CT. Lights turned off for pt comfort. call bell within reach.

## 2019-05-17 NOTE — ED ADULT NURSE NOTE - CINV DISCH MEDS REVIEWED YN
"  SUBJECTIVE:  Evelio Gutierrez is a 5 year old male who presents with the following concerns;              Symptoms: cc Present Absent Comment   Fever/Chills  x     Fatigue  x     Muscle Aches   x    Eye Irritation   x    Sneezing   x    Nasal Abiel/Drg  x     Sinus Pressure/Pain  x     Loss of smell   x    Dental pain   x    Sore Throat  x     Swollen Glands   x    Ear Pain/Fullness   x    Cough   x    Wheeze   x    Chest Pain   x    Shortness of breath   x    Rash   x    Other  x  Tummy ache     Symptom duration:  3 days   Sympom severity:  moderate   Treatments tried:  OTC   Contacts:  none       Medications updated and reviewed.  Current Outpatient Prescriptions   Medication     fluticasone (FLONASE) 50 MCG/ACT spray     ibuprofen (ADVIL,MOTRIN) 100 MG/5ML suspension     Multiple Vitamins-Minerals (MULTIVITAMIN & MINERAL PO)     No current facility-administered medications for this visit.        Past, family and surgical history is updated and reviewed in the record.    ROS:  Other than noted above, general, HEENT, respiratory, cardiac and gastrointestinal systems are negative.    OBJECTIVE:  BP (!) 81/47  Pulse 128  Temp 102.8  F (39.3  C) (Tympanic)  Ht 4' 3.5\" (1.308 m)  Wt 65 lb 6 oz (29.7 kg)  SpO2 99%  BMI 17.33 kg/m2  GENERAL:  Alert, no acute distress  EYES:  PERRL, EOM normal, conjunctiva and lids normal  HEENT:  Ears and TMs normal, oral mucosa and posterior oropharynx normal  RESP:  Lungs clear to auscultation.  CV: normal rate, regular rhythm, no murmur or gallop.  ABDO: Soft. Generalized tenderness to palpation. No rebound tenderness. No focal McBurney's point tenderness.     DATA  Reviewed and discussed with patient prior to discharge.  Results for orders placed or performed in visit on 03/27/18   Rapid strep screen   Result Value Ref Range    Specimen Description Throat     Rapid Strep A Screen       NEGATIVE: No Group A streptococcal antigen detected by immunoassay, await culture report. "   Influenza A/B antigen   Result Value Ref Range    Influenza A/B Agn Specimen Nasal     Influenza A Negative NEG^Negative    Influenza B Negative NEG^Negative       Assessment/Plan:   Evelio was seen today for pharyngitis.    Diagnoses and all orders for this visit:    Viral syndrome  Reassured that this is self limiting.   Recommended supportive management. Increase fluid intake. Plenty of rest.   Tylenol+/-Ibuprofen as needed for discomfort and fever.    Throat pain  -     Rapid strep screen  -     Beta strep group A culture    Fever and chills  -     Influenza A/B antigen      Patient education and Handout with home care instructions given.       Follow up if symptoms fail to improve in 2-3 days or worsen.      The patient was in agreement with the plan today and had no questions or concerns prior to leaving the clinic.      Erendira Rojas M.D    [facility]       Yes/by Gabriella MACIEL

## 2019-05-17 NOTE — ED PROVIDER NOTE - OBJECTIVE STATEMENT
Pt is a 38yF with no significant pmh, came in d/t 4x day hx of numbness, tingling sensation on her L arm and leg, and mild weakness in the L leg, and shooting pain sensation in her R arm. Pt states she started feeling this numbness on her left since last saturday when she was at rest. She also has some weakness in her left leg. This sensation has persisted and had gotten worse in the following days. Reports no heavy lifting, or trauma recently. Never had this sensation before. Pt also reports headache in the back of her head around the same time, more so on the right, that spreads down her neck. She states she normally doesn't get headaches. Nothing really improved her pain, weakness, or numbnesss. Pt went to her PCP yesterday to get a cervical x-ray, but haven't yet heard of the results. Denies any fevers, chills, no N/D, some minor nausea. No travel hx, OCP usage, sick contacts, recent immobilization or hospitalization. No blood disorder runs in the family. Further denies rashes, dysphagia, confusion, photophobia.

## 2019-05-17 NOTE — CONSULT NOTE ADULT - ASSESSMENT
37yo AA woman with a PMHx of lumbar disc herniations, presents with complaints of sensory changes. Saturday 5/11, patient reports numbness of LUE. Lasting <1hr. Resolved. Sunday, patient states the numbness now present over RUE and RLE. Describes the sensory change as occasionally numbness vs. paresthesia. States episodes last <1hr, but can occur >10x/ day. Nothing makes it better or worse. Patient describes some weakness with the sensory loss, stating that its difficult to hold thing in the affected hand during the episode. Patient currently reports that she is at baseline. Patient denies fevers, chills, ns, cp, sob, abd pain, n/v/d/c.  In the ED, VSS, labs grossly wnl, CTH shows NAD.     Impression:    Patchy right sensory loss 2/2 central ischemia vs. demyelinating/inflammatory condition vs. multiple peripheral neuropathies.     Plan:  - B12, Folate, Thiamine, TSH, Cu, A1C, Lipids  - MRI Brain, C/T spine w/wo  - MRA H w/o, N w/  - CDU for observation 39yo AA woman with a PMHx of lumbar disc herniations, presents with complaints of sensory changes. Saturday 5/11, patient reports numbness of LUE. Lasting <1hr. Resolved. Sunday, patient states the numbness now present over RUE and RLE. Describes the sensory change as occasionally numbness vs. paresthesia. States episodes last <1hr, but can occur >10x/ day. Nothing makes it better or worse. Patient describes some weakness with the sensory loss, stating that its difficult to hold thing in the affected hand during the episode. Patient currently reports that she is at baseline. Patient denies fevers, chills, ns, cp, sob, abd pain, n/v/d/c.  In the ED, VSS, labs grossly wnl, CTH shows NAD.     Impression:    Patchy right sensory loss 2/2 central ischemia vs. demyelinating/inflammatory condition vs. multiple peripheral neuropathies.     Plan:  - B12, Folate, Thiamine, TSH, Cu, A1C, Lipids  - Orthostatics  - MRI Brain, C/T spine w/wo  - MRA H w/o, N w/  - CDU for observation 39yo AA woman with a PMHx of lumbar disc herniations, presents with complaints of sensory changes. Saturday 5/11, patient reports numbness of LUE. Lasting <1hr. Resolved. Sunday, patient states the numbness now present over RUE and RLE. Describes the sensory change as occasionally numbness vs. paresthesia. States episodes last <1hr, but can occur >10x/ day. Nothing makes it better or worse. Patient describes some weakness with the sensory loss, stating that its difficult to hold thing in the affected hand during the episode. Patient currently reports that she is at baseline. Patient denies fevers, chills, ns, cp, sob, abd pain, n/v/d/c.  In the ED, VSS, labs grossly wnl, CTH shows NAD.     Impression:    Patchy right sensory loss 2/2 central ischemia vs. demyelinating/inflammatory condition vs. multiple peripheral neuropathies.     Plan:  - B12, Folate, Thiamine, TSH, Cu, A1C, Lipids  - Orthostatics  - rEEG  - MRI Brain, C/T spine w/wo  - MRA H w/o, N w/  - CDU for observation

## 2019-05-18 LAB
CULTURE RESULTS: SIGNIFICANT CHANGE UP
SPECIMEN SOURCE: SIGNIFICANT CHANGE UP

## 2020-10-06 ENCOUNTER — TRANSCRIPTION ENCOUNTER (OUTPATIENT)
Age: 40
End: 2020-10-06

## 2020-10-06 ENCOUNTER — EMERGENCY (EMERGENCY)
Facility: HOSPITAL | Age: 40
LOS: 1 days | End: 2020-10-06
Attending: EMERGENCY MEDICINE
Payer: MEDICAID

## 2020-10-06 VITALS
OXYGEN SATURATION: 100 % | HEART RATE: 88 BPM | RESPIRATION RATE: 20 BRPM | WEIGHT: 199.96 LBS | SYSTOLIC BLOOD PRESSURE: 139 MMHG | DIASTOLIC BLOOD PRESSURE: 94 MMHG | HEIGHT: 66 IN | TEMPERATURE: 99 F

## 2020-10-06 LAB
ALBUMIN SERPL ELPH-MCNC: 4.4 G/DL — SIGNIFICANT CHANGE UP (ref 3.3–5)
ALP SERPL-CCNC: 66 U/L — SIGNIFICANT CHANGE UP (ref 40–120)
ALT FLD-CCNC: 15 U/L — SIGNIFICANT CHANGE UP (ref 10–45)
ANION GAP SERPL CALC-SCNC: 12 MMOL/L — SIGNIFICANT CHANGE UP (ref 5–17)
APTT BLD: 29.7 SEC — SIGNIFICANT CHANGE UP (ref 27.5–35.5)
AST SERPL-CCNC: 15 U/L — SIGNIFICANT CHANGE UP (ref 10–40)
BILIRUB SERPL-MCNC: 0.7 MG/DL — SIGNIFICANT CHANGE UP (ref 0.2–1.2)
BUN SERPL-MCNC: 13 MG/DL — SIGNIFICANT CHANGE UP (ref 7–23)
CALCIUM SERPL-MCNC: 9.3 MG/DL — SIGNIFICANT CHANGE UP (ref 8.4–10.5)
CHLORIDE SERPL-SCNC: 107 MMOL/L — SIGNIFICANT CHANGE UP (ref 96–108)
CO2 SERPL-SCNC: 20 MMOL/L — LOW (ref 22–31)
CREAT SERPL-MCNC: 0.83 MG/DL — SIGNIFICANT CHANGE UP (ref 0.5–1.3)
GLUCOSE SERPL-MCNC: 99 MG/DL — SIGNIFICANT CHANGE UP (ref 70–99)
HCG SERPL-ACNC: <2 MIU/ML — SIGNIFICANT CHANGE UP
HCT VFR BLD CALC: 37.1 % — SIGNIFICANT CHANGE UP (ref 34.5–45)
HGB BLD-MCNC: 12.5 G/DL — SIGNIFICANT CHANGE UP (ref 11.5–15.5)
INR BLD: 1.02 RATIO — SIGNIFICANT CHANGE UP (ref 0.88–1.16)
MCHC RBC-ENTMCNC: 30.3 PG — SIGNIFICANT CHANGE UP (ref 27–34)
MCHC RBC-ENTMCNC: 33.7 GM/DL — SIGNIFICANT CHANGE UP (ref 32–36)
MCV RBC AUTO: 90 FL — SIGNIFICANT CHANGE UP (ref 80–100)
NRBC # BLD: 0 /100 WBCS — SIGNIFICANT CHANGE UP (ref 0–0)
PLATELET # BLD AUTO: 397 K/UL — SIGNIFICANT CHANGE UP (ref 150–400)
POTASSIUM SERPL-MCNC: 3.9 MMOL/L — SIGNIFICANT CHANGE UP (ref 3.5–5.3)
POTASSIUM SERPL-SCNC: 3.9 MMOL/L — SIGNIFICANT CHANGE UP (ref 3.5–5.3)
PROT SERPL-MCNC: 7.9 G/DL — SIGNIFICANT CHANGE UP (ref 6–8.3)
PROTHROM AB SERPL-ACNC: 12.2 SEC — SIGNIFICANT CHANGE UP (ref 10.6–13.6)
RBC # BLD: 4.12 M/UL — SIGNIFICANT CHANGE UP (ref 3.8–5.2)
RBC # FLD: 13.2 % — SIGNIFICANT CHANGE UP (ref 10.3–14.5)
SARS-COV-2 RNA SPEC QL NAA+PROBE: SIGNIFICANT CHANGE UP
SODIUM SERPL-SCNC: 139 MMOL/L — SIGNIFICANT CHANGE UP (ref 135–145)
WBC # BLD: 8.77 K/UL — SIGNIFICANT CHANGE UP (ref 3.8–10.5)
WBC # FLD AUTO: 8.77 K/UL — SIGNIFICANT CHANGE UP (ref 3.8–10.5)

## 2020-10-06 PROCEDURE — 99284 EMERGENCY DEPT VISIT MOD MDM: CPT

## 2020-10-06 PROCEDURE — 76512 OPH US DX B-SCAN: CPT | Mod: 26,LT

## 2020-10-06 PROCEDURE — 70546 MR ANGIOGRAPH HEAD W/O&W/DYE: CPT | Mod: 26,59

## 2020-10-06 PROCEDURE — 70553 MRI BRAIN STEM W/O & W/DYE: CPT | Mod: 26

## 2020-10-06 PROCEDURE — 99220: CPT

## 2020-10-06 RX ORDER — ACETAMINOPHEN 500 MG
650 TABLET ORAL ONCE
Refills: 0 | Status: COMPLETED | OUTPATIENT
Start: 2020-10-06 | End: 2020-10-06

## 2020-10-06 RX ORDER — SODIUM CHLORIDE 9 MG/ML
1000 INJECTION INTRAMUSCULAR; INTRAVENOUS; SUBCUTANEOUS ONCE
Refills: 0 | Status: COMPLETED | OUTPATIENT
Start: 2020-10-06 | End: 2020-10-06

## 2020-10-06 RX ORDER — ACETAMINOPHEN 500 MG
1000 TABLET ORAL ONCE
Refills: 0 | Status: COMPLETED | OUTPATIENT
Start: 2020-10-06 | End: 2020-10-06

## 2020-10-06 RX ORDER — ACETAMINOPHEN 500 MG
975 TABLET ORAL ONCE
Refills: 0 | Status: DISCONTINUED | OUTPATIENT
Start: 2020-10-06 | End: 2020-10-06

## 2020-10-06 RX ORDER — ACETAZOLAMIDE 250 MG/1
250 TABLET ORAL
Refills: 0 | Status: DISCONTINUED | OUTPATIENT
Start: 2020-10-06 | End: 2020-10-07

## 2020-10-06 RX ADMIN — ACETAZOLAMIDE 250 MILLIGRAM(S): 250 TABLET ORAL at 18:48

## 2020-10-06 RX ADMIN — SODIUM CHLORIDE 1000 MILLILITER(S): 9 INJECTION INTRAMUSCULAR; INTRAVENOUS; SUBCUTANEOUS at 13:20

## 2020-10-06 RX ADMIN — Medication 650 MILLIGRAM(S): at 14:00

## 2020-10-06 RX ADMIN — SODIUM CHLORIDE 1000 MILLILITER(S): 9 INJECTION INTRAMUSCULAR; INTRAVENOUS; SUBCUTANEOUS at 14:20

## 2020-10-06 RX ADMIN — Medication 650 MILLIGRAM(S): at 13:14

## 2020-10-06 RX ADMIN — Medication 400 MILLIGRAM(S): at 23:54

## 2020-10-06 NOTE — ED ADULT NURSE NOTE - ED STAT RN HANDOFF DETAILS
hand off given to oncoming RN Xi Modi. Neuro eval pt. pt continues to c/o blurry vision and HA. A&Ox4 hand off given to oncoming RN Kimberlyn Patel. Neuro eval pt. pt continues to c/o blurry vision and HA. A&Ox4

## 2020-10-06 NOTE — CONSULT NOTE ADULT - ASSESSMENT
39 yo F with no PMH presents to the ER with 2.5 weeks of visual disturbances and HA. 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 Ophthalmic ultrasound reveals b/l optic nerve swelling. She likely has increased intracranial pressure, etiology uncertain at this time but likely idiopathic intracranial hypertension. DDx also includes venous sinus thrombosis.    Plan:  -MRI brain w/o contrast  -MR venogram w/ IV contrast  -LP after MRI  -c/w diamox 250 mg BID for now

## 2020-10-06 NOTE — ED PROVIDER NOTE - PROGRESS NOTE DETAILS
DO Patsy: Spoke to Neurologist Dr. Una Rawls, who sent pt to ED from neurology office for MRI, MRV to r/o venous sinus thrombosis, LP, concern for Pseudotumor Cerebri, less concern for MS

## 2020-10-06 NOTE — ED PROVIDER NOTE - PHYSICAL EXAMINATION
CONSTITUTIONAL: well-appearing, in NAD  SKIN: Warm dry, normal skin turgor  HEAD: NCAT  EYES: EOMI, PERRLA, no scleral icterus, conjunctiva pink, no nystagmus, pt endorses horizontal diplopia when looking to the right, denies blurred vision, diplopia anywhere else.  NECK: Supple; non tender. Full ROM.  CARD: RRR, no murmurs.  RESP: clear to ausculation b/l. No crackles or wheezing.  EXT: No pedal edema, no calf tenderness  NEURO: Normal motor 5/5 str upper and lower extremities bilaterally. Normal sensory. CN II-XII intact. Cerebellar testing normal. Normal gait. No hyper/hyporeflexia upper and lower extremities bilaterally.  PSYCH: Cooperative, appropriate.

## 2020-10-06 NOTE — ED CDU PROVIDER INITIAL DAY NOTE - OBJECTIVE STATEMENT
40F presents to ED sent from neurologist for 2 week hx of intermittent severe headache she never had before w/ associated intermittent diplopia, blurred vision, generalized weakness which has gotten worse. Pt states sxs worse w/ movement, better w/ sitting still. Pt states they went to ED when sxs first started and had a negative CTA head + negative carotid doppler study, followed up outpt with ophthalmology  Dr Usman Goetz, who found bilateral optic nerve swelling and referred her to a neurologist Dr Rawls who sent her to the ED for suspected pseudotumor cerebri. Pt denies vomiting, nausea, fever, chills, any recent trauma/inciting events. Pt endorses occasional arm twitching but denies arm or leg weakness.  ED Course: Labs non-actionable, ocular US shows CN swelling c/w papilledema b/l. Pt evaluated by neuro, recommending CDU for MRI/MRV and if negative LP.

## 2020-10-06 NOTE — CONSULT NOTE ADULT - ATTENDING COMMENTS
Patient sent in by neurologist with 2-3 weeks of headaches and papilledema discovered by ophthalmologist on Friday. Has had photophobia and cervicalgia. HA worse when supine and has had horizontal diplopia when looking to the right. Has been on diamox 250mg BID for 3 days with mild improvement. Since LP , her headache is better.     MRI brain/MRV - empty sella with flattening of post. orbits. No SVT  LP done overnight - OP 33, closing pressure 18  CSF studies WNL.     Exam: EOMI. Face symmetric. PERRL. Strength is 5/5. no dysmetria. Gait is normal.     Imp: IIH  Plan  increase diamox 500mg BID.   f/u neurologist and ophtahlmologist tomorrow.

## 2020-10-06 NOTE — ED PROVIDER NOTE - CARE PLAN
Principal Discharge DX:	Nonintractable episodic headache, unspecified headache type  Secondary Diagnosis:	Diplopia  Secondary Diagnosis:	Papilledema of both eyes

## 2020-10-06 NOTE — ED PROVIDER NOTE - CLINICAL SUMMARY MEDICAL DECISION MAKING FREE TEXT BOX
brent 40 f with wt gain - x months with 2 weeks of inabilty to focus - double vison binocular - visual here 20/20 fields intact -- nonfocal neuro exam - seen by optho outpt with papiledema bl, out pt carotid doppler and cta neg ( pt will attempt to get report) and seen by neuro sent in for ? eval - concern for possible psudo tumer cerebri - will consider advanced imaging like MRI - will pocus to confirm papilledema -- neuro consult and possible obs - possible therapeutic lp vs tx w diamox - no other numbness weakness or neuro s/s to suggest MS -

## 2020-10-06 NOTE — ED CDU PROVIDER DISPOSITION NOTE - CLINICAL COURSE
40F presents to ED sent from neurologist for 2 week hx of intermittent severe headache she never had before w/ associated intermittent diplopia, blurred vision, generalized weakness which has gotten worse. Pt states sxs worse w/ movement, better w/ sitting still. Pt states they went to ED when sxs first started and had a negative CTA head + negative carotid doppler study, followed up outpt with ophthalmology  Dr Usman Goetz, who found bilateral optic nerve swelling and referred her to a neurologist Dr Rawls who sent her to the ED for suspected pseudotumor cerebri. Pt denies vomiting, nausea, fever, chills, any recent trauma/inciting events. Pt endorses occasional arm twitching but denies arm or leg weakness.  ED Course: Labs non-actionable, ocular US shows CN swelling c/w papilledema b/l. Pt evaluated by neuro, recommending CDU for MRI/MRV and if negative LP. MRI/MRV showed Increased flattening of the pituitary gland. Meckel's cave appears enlarged bilaterally. These findings can be seen in the presence of idiopathic intracranial hypertension. Otherwise imaging unremarkable. Neuro was paged for LP. 40F presents to ED sent from neurologist for 2 week hx of intermittent severe headache she never had before w/ associated intermittent diplopia, blurred vision, generalized weakness which has gotten worse. Pt states sxs worse w/ movement, better w/ sitting still. Pt states they went to ED when sxs first started and had a negative CTA head + negative carotid doppler study, followed up outpt with ophthalmology  Dr Usman Goetz, who found bilateral optic nerve swelling and referred her to a neurologist Dr Rawls who sent her to the ED for suspected pseudotumor cerebri. Pt denies vomiting, nausea, fever, chills, any recent trauma/inciting events. Pt endorses occasional arm twitching but denies arm or leg weakness.  ED Course: Labs non-actionable, ocular US shows CN swelling c/w papilledema b/l. Pt evaluated by neuro, recommending CDU for MRI/MRV and if negative LP. MRI/MRV showed Increased flattening of the pituitary gland. Meckel's cave appears enlarged bilaterally. These findings can be seen in the presence of idiopathic intracranial hypertension. Otherwise imaging unremarkable.  In the CDU patient had an LP showing elevated opening pressures.  Diamox increased to 500mgs. Headache improved. Patient cleared by neurology for DC with close outpatient follow up.

## 2020-10-06 NOTE — ED CDU PROVIDER INITIAL DAY NOTE - PHYSICAL EXAMINATION
GEN: Pt in NAD, A&O x3.  PSYCH: Affect appropriate.  EYES: Sclera white w/o injection. PERRL, EOMI.  ENT: Head NCAT. MMM. Neck supple FROM.  RESP: CTA b/l, no wheezes, rales, or rhonchi.   CARDIAC: RRR, clear distinct S1, S2, no appreciable murmurs.  ABD: Abdomen soft, non-tender. No CVAT b/l.  VASC: 2+ radial and dorsalis pedis pulses b/l. No edema or calf tenderness.]  NEURO: CN2-12 grossly intact. Normal and equal sensation and 5/5 strength UE and LE b/l. Pronator drift negative. Normal gait and gross cerebellar function.   SKIN: No rashes on the trunk.

## 2020-10-06 NOTE — ED ADULT TRIAGE NOTE - CHIEF COMPLAINT QUOTE
headache, dizziness, nausea with bilateral blurry vision for 2 weeks, saw Opthalmologist last Friday was given Diamox pills, sent by Neurologist today for MRI

## 2020-10-06 NOTE — ED PROVIDER NOTE - NS ED ROS FT
Constitutional:  (-) fever, (-) chills, (-) lethargy  Eyes:  (-) eye pain (+) diplopia intermittently  ENMT: (-) nasal discharge, (-) sore throat. (-) neck pain or stiffness  Cardiac: (-) chest pain (-) palpitations  Respiratory:  (-) cough (-) respiratory distress.   GI:  (-) nausea (-) vomiting (-) diarrhea (-) abdominal pain.  :  (-) dysuria (-) frequency (-) burning.  MS:  (-) back pain (-) joint pain.  Neuro:  (-) headache (-) numbness (-) tingling (-) focal weakness  Skin:  (-) rash  Except as documented in the HPI,  all other systems are negative

## 2020-10-06 NOTE — ED CDU PROVIDER DISPOSITION NOTE - NSFOLLOWUPINSTRUCTIONS_ED_ALL_ED_FT
(1) You will need to follow up with your PMD and our recommended neurologist (____) in 2-3 days for your _____. A copy of your results were given to you to bring to your appt.  (2) Continue your home medications as directed.  (3) Drink plenty of fluids to stay hydrated.  (4) Read attached discharge paperwork.  (5) Return to ER for headache, confusion, behavior/speech changes, numbness/tingling/weakness in your arms/legs, or any other concerns. 1.  Stay well hydrated  2.  Continue current home medications  3.  Increase Diamox to 500mgs every 12 hrs  4.  Follow up with your ophthalmologist and neurologist within 2-3 days.  Bring a copy of your results with you to your appointment  5.  Return to the ER for worsening headache, blurry vision, weakness, numbness or any other concerning symptoms

## 2020-10-06 NOTE — ED PROVIDER NOTE - OBJECTIVE STATEMENT
40F presents to ED sent from neurologist for 2 week hx of intermittent severe headache she never had before w/ associated intermittent diplopia, blurred vision, generalized weakness which has gotten worse. Pt states sxs worse w/ movement, better w/ sitting still. Pt states they went to ED when sxs first started and had a negative CTA head + negative carotid doppler study, followed up outpt with ophthalmology  Dr Usman Goetz, who found bilateral optic nerve swelling and referred her to a neurologist Dr Rawls who sent her to the ED for suspected pseudotumor cerebri. Pt denies vomiting, nausea, fever, chills, any recent trauma/inciting events. Pt endorses occasional arm twitching but denies arm or leg weakness.

## 2020-10-06 NOTE — ED ADULT NURSE NOTE - OBJECTIVE STATEMENT
1245 40 yr old BF c/o persistent HA, pain over left eye and left posterior head , and blurry vision x 2 wks. Was eval by  last week with CT done 4 days ago. Diamox started at that time. A&Ox4. Brought in from triage in wheelchair. Denies difficulty walking. Skin W&D. Lips and nailbeds pink. PERRL. Denies fever, chills, neck pain, cough, congestion or exposure to COVID 19

## 2020-10-06 NOTE — ED CDU PROVIDER DISPOSITION NOTE - PATIENT PORTAL LINK FT
You can access the FollowMyHealth Patient Portal offered by [facility] by registering at the following website: [email or web address]. By joining [employer]’s FollowMyHealth portal, you will also be able to view your health information using other applications (apps) compatible with our system.

## 2020-10-06 NOTE — ED CDU PROVIDER INITIAL DAY NOTE - PROGRESS NOTE DETAILS
Pt seen at bedside. Pt in NAD, comfortable. VS stable from last reading. Pt notes mild HA, feels well at rest laying down, MRI/MRV negative, neuro paged for LP. CDU PROGRESS NOTE PA ASAF: Received pt at 1900 sign-out. Pt resting in stretcher in NAD. Case/plan reviewed. MRI/MRV showed Increased flattening of the pituitary gland. Meckel's cave appears enlarged bilaterally. These findings can be seen in the presence of idiopathic intracranial hypertension. Otherwise imaging unremarkable. Neuro was paged for LP. Pt reports having 6/10 headache to back of head and neck. Pt is aox3, Ambulatory around unit with steady gait. S1 S2 noted, RRR, lungs CTA b/l, BS x4 with soft, nontender abdomen. No focal neuro deficits on exam. Pt declined pain medication, will closely monitor. CDU PROGRESS NOTE PA ASAF: Received pt at 1900 sign-out. Pt resting in stretcher in NAD. Case/plan reviewed. MRI/MRV showed Increased flattening of the pituitary gland. Meckel's cave appears enlarged bilaterally. These findings can be seen in the presence of idiopathic intracranial hypertension. Otherwise imaging unremarkable. Neuro was paged for LP. Pt reports having 6/10 headache to back of head and neck. Pt is aox3, CN: VFF, incomplete burial of the sclera on R gaze (partial CN VI), with diplopia on endgaze towards R, PERRL, no LYSSA, V1-3 intact, no facial asymmetry, t/p midline. Pt declined pain medication, will closely monitor. CDU PROGRESS NOTE PA ASAF: Pt c/o 7/10 headache to back of head and neck. c/d/w Neurology, will attempt bedside LP w/ ED attending to evaluate opening pressure. Pt ordered Ofirmev 1gm IVPB. Will continue to monitor.

## 2020-10-06 NOTE — ED CDU PROVIDER INITIAL DAY NOTE - MEDICAL DECISION MAKING DETAILS
brent - pt with ha and diplopia - neuro checks  - mrv fro r/o venous thrombosis - and then possible lp and tx w daimox - - neuro to reeval

## 2020-10-06 NOTE — CONSULT NOTE ADULT - SUBJECTIVE AND OBJECTIVE BOX
MRN-[id number]  Patient is a 40y old  Female who presents with a chief complaint of   HPI: 39 yo F with no PMH presents to the ER with 2.5 weeks of visual disturbances. She reports that 2.5 weeks ago, she started to develop blurry vision and difficulty driving. She also had headaches at this time, as well as neck stiffness. Later on, she developed dizziness lightheadedness, and diplopia. She continue to have symptoms, and so 1 week ago, she went to [facility]. She had a CTH/CTA which were unremarkable. She was advised to see opthalmology and neurology. She saw an opthalmologist this past Friday, and she was told that he saw papilledema, and advised her to see a neurologist. She was started on diamox 250 mg BID at that time. Today, 10/6, she saw Dr. Carrasquillo, neurologist, and was advised to come to the ED    She endorses having visual obscuration and at times she sees a "dark shadow" in her peripheral vision. Endorse some nausea with HA, no vomiting. no weakness. she reports she occasionally gets slurred speech. Endorses tingling in hands and feet.  No OCPs.      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      Vital Signs Last 24 Hrs  T(C): 36.9 (06 Oct 2020 14:28), Max: 37.1 (06 Oct 2020 12:20)  T(F): 98.5 (06 Oct 2020 14:28), Max: 98.8 (06 Oct 2020 12:20)  HR: 76 (06 Oct 2020 14:28) (76 - 88)  BP: 110/72 (06 Oct 2020 14:28) (110/72 - 139/94)  BP(mean): --  RR: 16 (06 Oct 2020 14:28) (16 - 20)  SpO2: 100% (06 Oct 2020 14:28) (100% - 100%)    NEUROLOGICAL EXAM:  MS: AAOX3, fluent, attends b/l; recent and remote memory intact; normal attention, language and fund of knowledge.   CN: VFF, incomplete burial of the sclera on R gaze (partial CN VI), with diplopia on endgaze towards R, PERRL, no LYSSA, V1-3 intact, no facial asymmetry, t/p midline.  Eyes-Fundi: difficult to visualize  Motor: Strength: 5/5 4x. DTR 2+ symm. Sensation: intact to LT    NIHSS 0  mRS 0    Labs:   cbc                      12.5   8.77  )-----------( 397      ( 06 Oct 2020 13:50 )             37.1     [id number]    139  |  107  |  13  ----------------------------<  99  3.9   |  20<L>  |  0.83    Ca    9.3      06 Oct 2020 13:50    TPro  7.9  /  Alb  4.4  /  TBili  0.7  /  DBili  x   /  AST  15  /  ALT  15  /  AlkPhos  66  10-06    CoagsPT/INR - ( 06 Oct 2020 13:50 )   PT: 12.2 sec;   INR: 1.02 ratio         PTT - ( 06 Oct 2020 13:50 )  PTT:29.7 sec  Lipids  A1C  CardiacMarkers    LFTsLIVER FUNCTIONS - ( 06 Oct 2020 13:50 )  Alb: 4.4 g/dL / Pro: 7.9 g/dL / ALK PHOS: 66 U/L / ALT: 15 U/L / AST: 15 U/L / GGT: x

## 2020-10-06 NOTE — ED ADULT NURSE NOTE - NSIMPLEMENTINTERV_GEN_ALL_ED
Implemented All Fall Risk Interventions:  [place] to call system. Call bell, personal items and telephone within reach. Instruct patient to call for assistance. Room bathroom lighting operational. Non-slip footwear when patient is off stretcher. Physically safe environment: no spills, clutter or unnecessary equipment. Stretcher in lowest position, wheels locked, appropriate side rails in place. Provide visual cue, wrist band, yellow gown, etc. Monitor gait and stability. Monitor for mental status changes and reorient to person, place, and time. Review medications for side effects contributing to fall risk. Reinforce activity limits and safety measures with patient and family.

## 2020-10-07 VITALS
SYSTOLIC BLOOD PRESSURE: 94 MMHG | DIASTOLIC BLOOD PRESSURE: 66 MMHG | TEMPERATURE: 98 F | HEART RATE: 79 BPM | OXYGEN SATURATION: 98 % | RESPIRATION RATE: 18 BRPM

## 2020-10-07 LAB
APPEARANCE CSF: CLEAR — SIGNIFICANT CHANGE UP
APPEARANCE SPUN FLD: COLORLESS — SIGNIFICANT CHANGE UP
COLOR CSF: SIGNIFICANT CHANGE UP
GLUCOSE CSF-MCNC: 70 MG/DL — SIGNIFICANT CHANGE UP (ref 40–70)
LYMPHOCYTES # CSF: 71 % — SIGNIFICANT CHANGE UP (ref 40–80)
MONOS+MACROS NFR CSF: 10 % — LOW (ref 15–45)
NEUTROPHILS # CSF: 19 % — HIGH (ref 0–6)
NRBC NFR CSF: <1 — SIGNIFICANT CHANGE UP (ref 0–5)
PROT CSF-MCNC: 20 MG/DL — SIGNIFICANT CHANGE UP (ref 15–45)
RBC # CSF: 141 /UL — HIGH (ref 0–0)
TUBE TYPE: SIGNIFICANT CHANGE UP

## 2020-10-07 PROCEDURE — 85610 PROTHROMBIN TIME: CPT

## 2020-10-07 PROCEDURE — 96361 HYDRATE IV INFUSION ADD-ON: CPT

## 2020-10-07 PROCEDURE — 85027 COMPLETE CBC AUTOMATED: CPT

## 2020-10-07 PROCEDURE — 99217: CPT

## 2020-10-07 PROCEDURE — G0378: CPT

## 2020-10-07 PROCEDURE — 84157 ASSAY OF PROTEIN OTHER: CPT

## 2020-10-07 PROCEDURE — A9585: CPT

## 2020-10-07 PROCEDURE — 80053 COMPREHEN METABOLIC PANEL: CPT

## 2020-10-07 PROCEDURE — 99284 EMERGENCY DEPT VISIT MOD MDM: CPT | Mod: 25

## 2020-10-07 PROCEDURE — 82945 GLUCOSE OTHER FLUID: CPT

## 2020-10-07 PROCEDURE — 96365 THER/PROPH/DIAG IV INF INIT: CPT | Mod: XU

## 2020-10-07 PROCEDURE — 76512 OPH US DX B-SCAN: CPT

## 2020-10-07 PROCEDURE — 96375 TX/PRO/DX INJ NEW DRUG ADDON: CPT | Mod: XU

## 2020-10-07 PROCEDURE — 85730 THROMBOPLASTIN TIME PARTIAL: CPT

## 2020-10-07 PROCEDURE — 70553 MRI BRAIN STEM W/O & W/DYE: CPT

## 2020-10-07 PROCEDURE — 70546 MR ANGIOGRAPH HEAD W/O&W/DYE: CPT

## 2020-10-07 PROCEDURE — U0003: CPT

## 2020-10-07 PROCEDURE — 89051 BODY FLUID CELL COUNT: CPT

## 2020-10-07 PROCEDURE — 84702 CHORIONIC GONADOTROPIN TEST: CPT

## 2020-10-07 PROCEDURE — 62270 DX LMBR SPI PNXR: CPT

## 2020-10-07 RX ORDER — ALPRAZOLAM 0.25 MG
0.25 TABLET ORAL ONCE
Refills: 0 | Status: DISCONTINUED | OUTPATIENT
Start: 2020-10-07 | End: 2020-10-07

## 2020-10-07 RX ORDER — ACETAZOLAMIDE 250 MG/1
250 TABLET ORAL ONCE
Refills: 0 | Status: COMPLETED | OUTPATIENT
Start: 2020-10-07 | End: 2020-10-07

## 2020-10-07 RX ORDER — ACETAZOLAMIDE 250 MG/1
2 TABLET ORAL
Qty: 28 | Refills: 0
Start: 2020-10-07 | End: 2020-10-13

## 2020-10-07 RX ORDER — KETOROLAC TROMETHAMINE 30 MG/ML
15 SYRINGE (ML) INJECTION ONCE
Refills: 0 | Status: DISCONTINUED | OUTPATIENT
Start: 2020-10-07 | End: 2020-10-07

## 2020-10-07 RX ORDER — ACETAMINOPHEN 500 MG
975 TABLET ORAL ONCE
Refills: 0 | Status: COMPLETED | OUTPATIENT
Start: 2020-10-07 | End: 2020-10-07

## 2020-10-07 RX ORDER — ACETAZOLAMIDE 250 MG/1
500 TABLET ORAL ONCE
Refills: 0 | Status: DISCONTINUED | OUTPATIENT
Start: 2020-10-07 | End: 2020-10-07

## 2020-10-07 RX ORDER — CYCLOBENZAPRINE HYDROCHLORIDE 10 MG/1
10 TABLET, FILM COATED ORAL ONCE
Refills: 0 | Status: COMPLETED | OUTPATIENT
Start: 2020-10-07 | End: 2020-10-07

## 2020-10-07 RX ADMIN — CYCLOBENZAPRINE HYDROCHLORIDE 10 MILLIGRAM(S): 10 TABLET, FILM COATED ORAL at 00:31

## 2020-10-07 RX ADMIN — ACETAZOLAMIDE 250 MILLIGRAM(S): 250 TABLET ORAL at 09:05

## 2020-10-07 RX ADMIN — Medication 0.25 MILLIGRAM(S): at 00:32

## 2020-10-07 RX ADMIN — Medication 15 MILLIGRAM(S): at 02:17

## 2020-10-07 RX ADMIN — ACETAZOLAMIDE 250 MILLIGRAM(S): 250 TABLET ORAL at 06:34

## 2020-10-07 RX ADMIN — Medication 975 MILLIGRAM(S): at 09:56

## 2020-10-07 RX ADMIN — Medication 15 MILLIGRAM(S): at 02:45

## 2020-10-07 RX ADMIN — Medication 1000 MILLIGRAM(S): at 00:25

## 2020-10-07 NOTE — ED CDU PROVIDER SUBSEQUENT DAY NOTE - PROGRESS NOTE DETAILS
CDU PROGRESS NOTE PA ASAF: LP successful. Opening pressure 33, Closing pressure 18. 13ml of CSF obtained. Pt cleaned, left in supine position. Will f/u LP results and Neurology recs. CDU PROGRESS NOTE PA ASAF: Pt resting comfortably, NAD, VSS. Pt reports feeling better and pain subsiding. No interval change from prior exam. Will continue to monitor. Neurology re-eval in am. Patient seen at bedside in NAD.  VSS.  Patient resting comfortably.  Patient endorsing mild headache, improved from yesterday.  Neuro evaluated patient.  Recommending Diamox 500 BID for DC.  First dose now.  Will DC with diamox, neuro and optho follow up.  -Philip Rasheed PA-C pt c/o mild throbbing headache without other neuro sympt.  Increased ICP.  pt evaluated by neuro and may be d/c with increased dose of Diamox to 500 BID. pt advised to f/u with her neuro and ophtho in the next week.  pt is stable for d/c.

## 2020-10-07 NOTE — ED CDU PROVIDER SUBSEQUENT DAY NOTE - PHYSICAL EXAMINATION
GEN: Pt in NAD, A&O x3.  PSYCH: Affect appropriate.  EYES: Sclera white w/o injection.   ENT: Head NCAT. MMM. Neck supple FROM.  RESP: CTA b/l, no wheezes, rales, or rhonchi.   CARDIAC: RRR, clear distinct S1, S2, no appreciable murmurs.  ABD: Abdomen soft, non-tender. No CVAT b/l.  VASC: 2+ radial and dorsalis pedis pulses b/l. No edema or calf tenderness.]  NEURO: CN: VFF, incomplete burial of the sclera on R gaze (partial CN VI), with diplopia on endgaze towards R, PERRL, no LYSSA, V1-3 intact, no facial asymmetry, t/p midline.  Normal and equal sensation and 5/5 strength UE and LE b/l. Pronator drift negative. Normal gait and gross cerebellar function.   SKIN: No rashes on the trunk.

## 2020-10-07 NOTE — ED ADULT NURSE REASSESSMENT NOTE - NS ED NURSE REASSESS COMMENT FT1
1250 Bedside ultrasound of eye being done by Dr/ultrasound team. 1415 Awaiting MRI
1848 Received the Pt from  JAMES Sofia . Pt is Observed for headache completed the MRI  for LP. received the Pt A&OX 4 obeys commands C/O  headache 9/10  Geetha N/V/D fever chills cp SOB   Comfort care & safety measures  initiated  IV site looks clean & dry no signs of infiltration noted pt denies  pain IV site .  GCS 15/15 A&OX 4 PERRLA  size 3 Strong upper & lower extremities steady gait   No facial droop  No Hand Leg drop denies numbness tingling Continue to monitor
LP completed by neuro & anesthesia. Pt aware to lay flat on back for ~2hours. Safety maintained. Will continue to monitor.
Pt AO4. Neuro check intact. No deficits noted. Pt endorses improvement in headache and denies visual changes. Safety maintained. Will continue to monitor.
Pt back from CT. CDU to see pt.
Report received from JAMES Montemayor. Pt received lying in bed A&O x 3. States pain medication helped take edge off pain, but it still there. Denies CP, SOB, dizziness currently. Patient taken to MRI at 1530.
Report taken from Gladys RAMIREZ. States patient had a good night with no complaints. Will continue to monitor.
neuro check unchanged from previously. pt still complaining of 7/10 headache. Medicated as per MAR.
Pt received from JAMES Ogden. Pt oriented to CDU & plan of care was discussed. Pt AO4. Neuro check intact. No deficits noted. Pt complaining of 7/10 headache but does not want any meds at this time. Pt states she also has tingling in the fingertips. Pt denies any double/ blurry vision at the moment. Safety & comfort measures maintained. Call bell in reach. Will continue to monitor.

## 2020-10-09 ENCOUNTER — EMERGENCY (EMERGENCY)
Facility: HOSPITAL | Age: 40
LOS: 1 days | Discharge: HOME | End: 2020-10-09
Attending: EMERGENCY MEDICINE
Payer: MEDICAID

## 2020-10-09 VITALS
HEART RATE: 77 BPM | WEIGHT: 199.96 LBS | SYSTOLIC BLOOD PRESSURE: 136 MMHG | DIASTOLIC BLOOD PRESSURE: 95 MMHG | OXYGEN SATURATION: 100 % | TEMPERATURE: 99 F | HEIGHT: 66 IN | RESPIRATION RATE: 17 BRPM

## 2020-10-09 PROCEDURE — 99285 EMERGENCY DEPT VISIT HI MDM: CPT

## 2020-10-10 VITALS
DIASTOLIC BLOOD PRESSURE: 71 MMHG | TEMPERATURE: 99 F | RESPIRATION RATE: 17 BRPM | HEART RATE: 76 BPM | OXYGEN SATURATION: 100 % | SYSTOLIC BLOOD PRESSURE: 124 MMHG

## 2020-10-10 LAB
ALBUMIN SERPL ELPH-MCNC: 4.2 G/DL — SIGNIFICANT CHANGE UP (ref 3.3–5)
ALP SERPL-CCNC: 54 U/L — SIGNIFICANT CHANGE UP (ref 40–120)
ALT FLD-CCNC: 12 U/L — SIGNIFICANT CHANGE UP (ref 10–45)
ANION GAP SERPL CALC-SCNC: 10 MMOL/L — SIGNIFICANT CHANGE UP (ref 5–17)
APTT BLD: 28.8 SEC — SIGNIFICANT CHANGE UP (ref 27.5–35.5)
AST SERPL-CCNC: 13 U/L — SIGNIFICANT CHANGE UP (ref 10–40)
BASOPHILS # BLD AUTO: 0.03 K/UL — SIGNIFICANT CHANGE UP (ref 0–0.2)
BASOPHILS NFR BLD AUTO: 0.4 % — SIGNIFICANT CHANGE UP (ref 0–2)
BILIRUB SERPL-MCNC: 0.6 MG/DL — SIGNIFICANT CHANGE UP (ref 0.2–1.2)
BUN SERPL-MCNC: 15 MG/DL — SIGNIFICANT CHANGE UP (ref 7–23)
CALCIUM SERPL-MCNC: 9 MG/DL — SIGNIFICANT CHANGE UP (ref 8.4–10.5)
CHLORIDE SERPL-SCNC: 109 MMOL/L — HIGH (ref 96–108)
CO2 SERPL-SCNC: 16 MMOL/L — LOW (ref 22–31)
CREAT SERPL-MCNC: 0.81 MG/DL — SIGNIFICANT CHANGE UP (ref 0.5–1.3)
EOSINOPHIL # BLD AUTO: 0.08 K/UL — SIGNIFICANT CHANGE UP (ref 0–0.5)
EOSINOPHIL NFR BLD AUTO: 1 % — SIGNIFICANT CHANGE UP (ref 0–6)
GLUCOSE SERPL-MCNC: 110 MG/DL — HIGH (ref 70–99)
HCG SERPL-ACNC: <2 MIU/ML — SIGNIFICANT CHANGE UP
HCT VFR BLD CALC: 34.6 % — SIGNIFICANT CHANGE UP (ref 34.5–45)
HGB BLD-MCNC: 11.4 G/DL — LOW (ref 11.5–15.5)
IMM GRANULOCYTES NFR BLD AUTO: 0.4 % — SIGNIFICANT CHANGE UP (ref 0–1.5)
INR BLD: 1.03 RATIO — SIGNIFICANT CHANGE UP (ref 0.88–1.16)
LYMPHOCYTES # BLD AUTO: 2.24 K/UL — SIGNIFICANT CHANGE UP (ref 1–3.3)
LYMPHOCYTES # BLD AUTO: 29.2 % — SIGNIFICANT CHANGE UP (ref 13–44)
MCHC RBC-ENTMCNC: 30.1 PG — SIGNIFICANT CHANGE UP (ref 27–34)
MCHC RBC-ENTMCNC: 32.9 GM/DL — SIGNIFICANT CHANGE UP (ref 32–36)
MCV RBC AUTO: 91.3 FL — SIGNIFICANT CHANGE UP (ref 80–100)
MONOCYTES # BLD AUTO: 0.61 K/UL — SIGNIFICANT CHANGE UP (ref 0–0.9)
MONOCYTES NFR BLD AUTO: 8 % — SIGNIFICANT CHANGE UP (ref 2–14)
NEUTROPHILS # BLD AUTO: 4.68 K/UL — SIGNIFICANT CHANGE UP (ref 1.8–7.4)
NEUTROPHILS NFR BLD AUTO: 61 % — SIGNIFICANT CHANGE UP (ref 43–77)
NRBC # BLD: 0 /100 WBCS — SIGNIFICANT CHANGE UP (ref 0–0)
PLATELET # BLD AUTO: 360 K/UL — SIGNIFICANT CHANGE UP (ref 150–400)
POTASSIUM SERPL-MCNC: 3.8 MMOL/L — SIGNIFICANT CHANGE UP (ref 3.5–5.3)
POTASSIUM SERPL-SCNC: 3.8 MMOL/L — SIGNIFICANT CHANGE UP (ref 3.5–5.3)
PROT SERPL-MCNC: 7.2 G/DL — SIGNIFICANT CHANGE UP (ref 6–8.3)
PROTHROM AB SERPL-ACNC: 12.3 SEC — SIGNIFICANT CHANGE UP (ref 10.6–13.6)
RBC # BLD: 3.79 M/UL — LOW (ref 3.8–5.2)
RBC # FLD: 13.3 % — SIGNIFICANT CHANGE UP (ref 10.3–14.5)
SODIUM SERPL-SCNC: 135 MMOL/L — SIGNIFICANT CHANGE UP (ref 135–145)
WBC # BLD: 7.67 K/UL — SIGNIFICANT CHANGE UP (ref 3.8–10.5)
WBC # FLD AUTO: 7.67 K/UL — SIGNIFICANT CHANGE UP (ref 3.8–10.5)

## 2020-10-10 PROCEDURE — 84702 CHORIONIC GONADOTROPIN TEST: CPT

## 2020-10-10 PROCEDURE — 85730 THROMBOPLASTIN TIME PARTIAL: CPT

## 2020-10-10 PROCEDURE — 96374 THER/PROPH/DIAG INJ IV PUSH: CPT | Mod: XU

## 2020-10-10 PROCEDURE — 85610 PROTHROMBIN TIME: CPT

## 2020-10-10 PROCEDURE — 80053 COMPREHEN METABOLIC PANEL: CPT

## 2020-10-10 PROCEDURE — 72132 CT LUMBAR SPINE W/DYE: CPT | Mod: 26

## 2020-10-10 PROCEDURE — 72132 CT LUMBAR SPINE W/DYE: CPT

## 2020-10-10 PROCEDURE — 99285 EMERGENCY DEPT VISIT HI MDM: CPT | Mod: 25

## 2020-10-10 PROCEDURE — 96375 TX/PRO/DX INJ NEW DRUG ADDON: CPT | Mod: XU

## 2020-10-10 PROCEDURE — 85025 COMPLETE CBC W/AUTO DIFF WBC: CPT

## 2020-10-10 RX ORDER — SODIUM CHLORIDE 9 MG/ML
1000 INJECTION INTRAMUSCULAR; INTRAVENOUS; SUBCUTANEOUS ONCE
Refills: 0 | Status: COMPLETED | OUTPATIENT
Start: 2020-10-10 | End: 2020-10-10

## 2020-10-10 RX ORDER — METOCLOPRAMIDE HCL 10 MG
10 TABLET ORAL ONCE
Refills: 0 | Status: COMPLETED | OUTPATIENT
Start: 2020-10-10 | End: 2020-10-10

## 2020-10-10 RX ORDER — ACETAMINOPHEN 500 MG
975 TABLET ORAL ONCE
Refills: 0 | Status: COMPLETED | OUTPATIENT
Start: 2020-10-10 | End: 2020-10-10

## 2020-10-10 RX ORDER — MAGNESIUM SULFATE 500 MG/ML
1 VIAL (ML) INJECTION ONCE
Refills: 0 | Status: COMPLETED | OUTPATIENT
Start: 2020-10-10 | End: 2020-10-10

## 2020-10-10 RX ADMIN — SODIUM CHLORIDE 1000 MILLILITER(S): 9 INJECTION INTRAMUSCULAR; INTRAVENOUS; SUBCUTANEOUS at 00:48

## 2020-10-10 RX ADMIN — Medication 100 GRAM(S): at 01:28

## 2020-10-10 RX ADMIN — Medication 975 MILLIGRAM(S): at 00:48

## 2020-10-10 RX ADMIN — Medication 10 MILLIGRAM(S): at 00:48

## 2020-10-10 RX ADMIN — Medication 975 MILLIGRAM(S): at 02:25

## 2020-10-10 NOTE — ED ADULT NURSE REASSESSMENT NOTE - NS ED NURSE REASSESS COMMENT FT1
Pt asleep in stretcher, pt appears to be in no acute distress at this time. Pending CT results. Side rails up, call bell within reach.

## 2020-10-10 NOTE — ED PROVIDER NOTE - PHYSICAL EXAMINATION
Gen: WDWN, NAD  HEENT: EOMI, no nasal discharge, mucous membranes moist  CV: RRR, +S1/S2, no M/R/G  Resp: CTAB, no W/R/R  GI: Abdomen soft non-distended, NTTP, no masses  MSK: No open wounds, no bruising, no LE edema. TTP of the lumbar spine, no stepoffs.   Neuro: A&Ox4, following commands, moving all four extremities spontaneously. 5/5 strength in b/l UE/LE.  Sensation intact bl in UE/LE.   Psych: appropriate mood

## 2020-10-10 NOTE — ED PROVIDER NOTE - OBJECTIVE STATEMENT
The patient is a 40y Female, hx of IIH, lumar disc bulging, complaining of headache and L leg weakness. onset 1d prior.     Presented to ED 3d prior with headache and b/l optic nerve swelling, LP done, dx of IIH. The patient is a 40y Female, hx of IIH, lumbar disc bulging, complaining of headache and L LE weakness. onset 1d prior. headache is diffuse, worse when laying down. Presented to ED 3d prior with headache and b/l optic nerve swelling, LP done, dx of IIH. Pt currently denies any blurry vision, nausea/vomiting, paresthesias or numbness of b/l LE.

## 2020-10-10 NOTE — ED PROVIDER NOTE - ATTENDING CONTRIBUTION TO CARE
attending Christopher: 40yF with recent CDU stay for HA with diagnosis of intracranial hypertension, discharged on acetazolamide here with headache since LP. HA reported to be positional assoc with mild photophobia. Also with subjective LLE weakness but no objective weakness noted on examination. Normal gait, normal DTRs, normal sensation. Will review prior hospitalization records, obtain labs, CT lumbar spine given LLE weakness, will consider MRI if negative, treat symptomatically for headache, consider evaluation for blood patch if not improved with medications.

## 2020-10-10 NOTE — ED PROVIDER NOTE - CLINICAL SUMMARY MEDICAL DECISION MAKING FREE TEXT BOX
Damaris Vincent, PGY-1: Pt is a 41YO female with headache s/p recent LP. endorses L leg weakness. consider epidural abscess, low suspicion given absence of fever and neuro exam normal. plan for pain control with tylenol reglan magnesium iv fluids. workup CT lumbar spine, consider blood patch if no pain improvement. dispo pending pt response to pain medication. if pain controlled, dc home.

## 2020-10-10 NOTE — ED PROVIDER NOTE - PATIENT PORTAL LINK FT
You can access the FollowMyHealth Patient Portal offered by [facility] by registering at the following website: [email or web address]. By joining [facility]’s FollowMyHealth portal, you will also be able to view your health information using other applications (apps) compatible with our system.

## 2020-10-10 NOTE — ED PROVIDER NOTE - NSFOLLOWUPINSTRUCTIONS_ED_ALL_ED_FT
Thank you for visiting our Emergency Department, it has been a pleasure taking part in your healthcare. Please follow up with your primary doctor or neurologist within x48 hours.    Your discharge diagnosis is: post-LP headache    Return precautions to the Emergency Department include but are not limited to: unrelenting nausea, vomiting, fever, chills, chest pain, shortness of breath, dizziness, abdominal pain, worsening pain, syncope, blood in urine or stool, headache that doesn't resolve, numbness or tingling, loss of sensation, loss of motor function, or any other concerning symptoms.    your headache is likely due to recent procedure of LP  take tylenol and motrin as needed for pain.   return to ED if any neurological symptoms of numbness/weakness/tingling.     Post procedure headache:  - You may develop a headache during the first few hours after your LP, which may last  up to several days. This happens when the amount of CSF and the CSF pressure are  decreased, such as with a CSF leak. The headache may range from mild to severe  and may get worse when you sit or stand. You may have neck or back pain, as well.  The following may help ease or prevent a post procedure headache:  o Drinking liquids: You may be asked to drink more liquid than usual after your  procedure. For most people, the best liquids to drink are caffeinated. Do not  drink alcohol. Tell your caregiver if you cannot drink a lot of liquid because of  another medical condition, such as a heart or kidney condition.  o Lying down: You may need to lie flat for some time after your procedure.  o Treatment options: You may have any of the following:  ? Medicines:  • Caffeine: Caffeine may be used to treat a post procedure  headache. As CSF pressure decreases, such as with a leak,  blood vessels in the brain will dilate (get bigger) to get more  fluid to the brain. Headache pain is caused by the blood  vessels getting bigger. Caffeine causes the blood vessels in  the brain to get smaller, which will decrease your headache  pain. Drink caffeinated drinks, such as coffee or tea, every 4  to 6 hours. If this does not relieve your headache, call your  caregiver.  • Pain medicine: You may need medicine to relieve or decrease  you headache pain. These medicine may include NSAIDS   3/8/2016  (non-steroidal anti-inflammatory medicine), such as  ibuprofen, acetaminophen, or medicine that your primary  caregiver orders for you. Your primary caregiver will decide  which medicine is best for you to take for your headache.  Follow your primary caregiver’s instructions on how to take  your medicine. Tell your primary caregiver if the pain  medicine does not help or if you have any questions about  your medicine.  ? Procedures: You may need to have a blood patch if your headache is  not relieved by the treatments above.  • Blood patch: If your headache is caused by a leakage of CSF  from the LP site, a blood patch procedure may be needed.  This procedure uses a small amount of your blood, which is  taken from a vein, to patch (seal) the leak. The blood is put  through a need into your spinal canal in the same way that the  LP was done. You will need to lie in bed for 1 to 2 hours after  this procedure. This procedure may need to be repeated if  your headache is not relieved.  CALL THE NEURORADIOLOGY DEPARTMENT IF:  - You have severe pain in your back or neck that was not present before your  procedure or that is much worsened after your procedure.  - You have bleeding or a discharge coming from the area where the needle was put  into your back.  - You have questions or concerns about your procedure.  SEEK CARE IMMEDIATELY IF:  - You have a headache that is very bad and does not get better after lying down.  - You have a fever.  - You have a stiff neck or have trouble thinking clearly.  - Your legs, feet, or other parts below the waist feel numb, tingly, or weak and if these  are new or worsened symptoms after your procedure.

## 2020-10-10 NOTE — ED ADULT NURSE NOTE - OBJECTIVE STATEMENT
Pt is a 39 y/o female presents to the ED complaining of headache & left leg weakness s/p spinal tap done Wednesday. Pt says her headache is 9/10 pain, she additionally has SOB, worsening light sensitivity and feels like her L left is weak. She additionally endorses have nausea. PERRL 4mm, strength 5/5 x 4 extremities, able to follow commands. She denies v/d, chest pain, fever, chills, new numbness & tingling. Breathing spontaneous & nonlabored, sating 100% on room air. Abdomen soft & nondistended. Strong peripheral pulses noted b/l, skin clean dry & intact. Pt is a 41 y/o female presents to the ED complaining of headache & left leg weakness s/p spinal tap done Wednesday. Spinal tap was done for Idiopathic intracranial HTN. Pt says her headache is 9/10 pain, she additionally has SOB, worsening light sensitivity and feels like her L left is weak. She additionally endorses have nausea. PERRL 4mm, strength 5/5 x 4 extremities, able to follow commands. She denies v/d, chest pain, fever, chills, new numbness & tingling. Breathing spontaneous & nonlabored, sating 100% on room air. Abdomen soft & nondistended. Strong peripheral pulses noted b/l, skin clean dry & intact. Pt is a 41 y/o female presents to the ED complaining of headache & left leg weakness s/p spinal tap done Wednesday. Spinal tap was done for Idiopathic intracranial HTN. Pt says her headache is 9/10 pain and feels like her L left is weak. She additionally endorses having nausea. PERRL 4mm, strength 5/5 x 4 extremities, able to follow commands, gait steady. Breathing spontaneous & nonlabored, sating 100% on room air. Abdomen soft & nondistended. Strong peripheral pulses noted b/l, skin clean dry & intact. She denies v/d, chest pain, fever, chills, new numbness & tingling.

## 2020-10-10 NOTE — ED PROVIDER NOTE - PROGRESS NOTE DETAILS
Damaris Vincent, PGY-1: Pt endorses pain improvement, now 5/10, presented with pain 10/10 initially. attending Christopher: lengthy discussion with patient at bedside regarding test results. Offered patient CDU for MRI of lumbar spine and possible blood patch for concern for s/p Lp headache. Pt reports significantly improved symptoms and wishes to go home. Discussed risks of leaving including worsening of symptoms and permanent disability, pt understands risks and wishes to go home. Will dc with close outpatient follow-up with neuro and strict return precautions

## 2020-10-10 NOTE — ED PROVIDER NOTE - NS ED ROS FT
Gen: Denies fever, chills  CV: Denies chest pain, palpitations  Skin: Denies color changes  Resp: Denies SOB, cough  Endo: Denies increased urination  GI: Denies diarrhea, constipation, nausea, vomiting  Msk: + low back pain, + L leg weakness  : Denies dysuria  Neuro: + L leg weakness  Psych: Denies mood changes

## 2020-10-10 NOTE — ED ADULT NURSE NOTE - NSIMPLEMENTINTERV_GEN_ALL_ED
Implemented All Universal Safety Interventions:  [place] to call system. Call bell, personal items and telephone within reach. Instruct patient to call for assistance. Room bathroom lighting operational. Non-slip footwear when patient is off stretcher. Physically safe environment: no spills, clutter or unnecessary equipment. Stretcher in lowest position, wheels locked, appropriate side rails in place.

## 2021-02-26 NOTE — ED ADULT NURSE NOTE - FALLEN IN THE PAST
Spinal fluid is normal.  It may be best to have a repeat visit - perhaps by video or telephone to go over these results and discuss whether they would be interested in any empiric treatment for autoimmune cause of ataxia.     no

## 2021-06-10 NOTE — ED CDU PROVIDER INITIAL DAY NOTE - PROGRESS NOTE DETAILS
MRI done. no complaints pt seen and evaluated by Neuro. MRI Brain and Cspine w and w.o contrast with No acute pathology. Neuro recommendation to follow up for outpt emg and neurophysiology for further workup. pt understands the plan and discharge instructions.  Case discussed with Dr. Celis. Pt resting comfortably. NAD. No complaints. VSS. pending MRI. Yes

## 2021-06-11 DIAGNOSIS — F41.9 ANXIETY DISORDER, UNSPECIFIED: ICD-10-CM

## 2021-06-11 DIAGNOSIS — M54.16 RADICULOPATHY, LUMBAR REGION: ICD-10-CM

## 2021-06-11 DIAGNOSIS — Z82.49 FAMILY HISTORY OF ISCHEMIC HEART DISEASE AND OTHER DISEASES OF THE CIRCULATORY SYSTEM: ICD-10-CM

## 2021-06-11 DIAGNOSIS — Z78.9 OTHER SPECIFIED HEALTH STATUS: ICD-10-CM

## 2021-06-11 DIAGNOSIS — Z12.72 ENCOUNTER FOR SCREENING FOR MALIGNANT NEOPLASM OF VAGINA: ICD-10-CM

## 2021-06-11 DIAGNOSIS — Z86.39 PERSONAL HISTORY OF OTHER ENDOCRINE, NUTRITIONAL AND METABOLIC DISEASE: ICD-10-CM

## 2021-06-11 DIAGNOSIS — F32.9 ANXIETY DISORDER, UNSPECIFIED: ICD-10-CM

## 2021-06-11 DIAGNOSIS — E01.0 IODINE-DEFICIENCY RELATED DIFFUSE (ENDEMIC) GOITER: ICD-10-CM

## 2021-06-11 RX ORDER — NAPROXEN 500 MG/1
500 TABLET ORAL
Refills: 0 | Status: ACTIVE | COMMUNITY

## 2021-06-11 RX ORDER — TRAZODONE HYDROCHLORIDE 100 MG/1
100 TABLET ORAL
Refills: 0 | Status: ACTIVE | COMMUNITY

## 2021-06-11 RX ORDER — PROMETHAZINE HYDROCHLORIDE AND DEXTROMETHORPHAN HYDROBROMIDE ORAL SOLUTION 15; 6.25 MG/5ML; MG/5ML
6.25-15 SOLUTION ORAL
Refills: 0 | Status: ACTIVE | COMMUNITY

## 2021-06-11 RX ORDER — IBUPROFEN 600 MG/1
600 TABLET, FILM COATED ORAL
Refills: 0 | Status: ACTIVE | COMMUNITY

## 2021-06-11 RX ORDER — OMEPRAZOLE 20 MG/1
20 CAPSULE, DELAYED RELEASE ORAL
Refills: 0 | Status: ACTIVE | COMMUNITY

## 2021-06-11 RX ORDER — METHYLPREDNISOLONE 4 MG/1
4 TABLET ORAL
Refills: 0 | Status: ACTIVE | COMMUNITY

## 2021-06-30 ENCOUNTER — APPOINTMENT (OUTPATIENT)
Dept: PHYSICAL MEDICINE AND REHAB | Facility: CLINIC | Age: 41
End: 2021-06-30
Payer: MEDICAID

## 2021-06-30 VITALS
HEART RATE: 108 BPM | TEMPERATURE: 96.6 F | BODY MASS INDEX: 31.98 KG/M2 | SYSTOLIC BLOOD PRESSURE: 138 MMHG | DIASTOLIC BLOOD PRESSURE: 86 MMHG | WEIGHT: 199 LBS | OXYGEN SATURATION: 99 % | RESPIRATION RATE: 18 BRPM | HEIGHT: 66 IN

## 2021-06-30 DIAGNOSIS — M13.0 POLYARTHRITIS, UNSPECIFIED: ICD-10-CM

## 2021-06-30 PROCEDURE — 99215 OFFICE O/P EST HI 40 MIN: CPT

## 2021-06-30 RX ORDER — DULOXETINE HYDROCHLORIDE 20 MG/1
20 CAPSULE, DELAYED RELEASE PELLETS ORAL AT BEDTIME
Qty: 30 | Refills: 0 | Status: ACTIVE | COMMUNITY
Start: 2021-06-30 | End: 1900-01-01

## 2021-07-01 PROBLEM — M13.0 ARTHRITIS OF MULTIPLE SITES: Status: ACTIVE | Noted: 2021-06-30

## 2021-07-01 LAB
CRP SERPL-MCNC: <3 MG/L
ERYTHROCYTE [SEDIMENTATION RATE] IN BLOOD BY WESTERGREN METHOD: 23 MM/HR
RHEUMATOID FACT SER QL: <10 IU/ML

## 2021-07-01 NOTE — PHYSICAL EXAM
[FreeTextEntry1] : General:\par     well developed, well nourished, in no mild distress.  Alert and oriented x 3. Pleasant. English\par \par Neck:\par      limited rotation to the left 0-50° rotation to the right 0-50 Positive spurlings  to left.\par Chest Wall:\par     no deformities \par Lungs:\par     clear bilaterally to auscultation.  \par Heart:\par     non-displaced PMI, chest non-tender; regular rate and rhythm, S1, S2 without murmurs, rubs, or gallops\par Abdomen:\par      normal bowel sounds; no hepatosplenomegaly no ventral,umbilical hernias or masses noted.  \par Msk:\par      thoracic spine she is tender in the inter scapula area to touch T4-T9.  She still has spasms in the paraspinal muscles.\par \par  Lumbar spine she is tenderness in the bilateral SI joints. She still has spasms in the paraspinal muscles. Flexion 0-80\par Pulses:\par     pulses normal in all 4 extremities.  \par Extremities:\par     LUE: Shoulder flexion 0-180 abduction 0-180 MS 5-/5\par Elbow FAROM, MS 5/5 reflexes 2/4 Tender of the right elbow\par Wrist FAROM, MS 5/5 reflexes 2/4\par sensation is intact to light touch, pinprick and proprioception\par \par RUE: Shoulder flexion 0-180 abduction 0-180 MS 5-/5\par Elbow FAROM, MS 5-/5 reflexes 2/4\par Wrist FAROM, MS 5-/5 reflexes 2/4\par sensation is intact to light touch, pinprick and proprioception\par \par LLE: Hip FAROM, MS 5-/5  tender over the lateral hip bursa\par knee: FAROM, MS 5-/5 Reflexes 2/4\par ankle: FAROM, MS 5-/5 reflexes 2/4\par sensation is intact to light touch, pinprick and proprioception.  no numbness over anterior lateral thigh\par  negative FABERs, negative FADIRs, Negative SLRs \par \par RLE: Hip FAROM, MS 5-/5 \par knee: FAROM, MS  3/5 Reflexes 2/4 right knee crepitus + medial mcmuurays\par ankle: FAROM, MS 5-/5 reflexes 2/4\par Tender on palpation of the plantar fascia\par sensation is intact to light touch, pinprick and proprioception.  no numbness over anterior lateral thigh\par  negative FABERs, negative FADIRs,Negative SLRs

## 2021-07-01 NOTE — HISTORY OF PRESENT ILLNESS
[FreeTextEntry1] : 40 year old female presents Right elbow pain and right knee pain\par She has been attending therapy  2 x week x 1 month\par her pains are slightly improved. \par \par Right knee pain\par Pain 5/10. Quality shooting burning and aching.  The pain starts  in the knee with radiation to the thigh. The pain is least in the morning and the pain worsens during the day. Her knee is worse with standing more than 20 minutes or go up and down stairs. She denies recent fall or accidents\par \par Right elbow pain\par Pain is 7/10 quality: stabbing\par the pain starts over the lateral elbow that is worse with  of the right hand. The Pain is worse with touch Over the lateral elbow. No gross deformities..\par \par Low back pain\par  \par Low Back pain 8/10.  Worst 10/10  Quality: stabbing. Frequency: on/off. The pain starts in the left low back and radiates to the left calf. Pain is aggravated with walking with 1 blocks. The pain is aggravated with bending at the hips. No bowel or bladder difficulty.\par \par Upper back pain.  pain is 7/10. Worse 10/10 The pain is a pulling Frequency is constant. The pain \par The pain starts in the upper back between the shoulder blades radiates to the upper low back.The pain radiates down the left arm. \par \par Left hip pain\par Pain:  8/10 Worse: 10/10 Quality: sharp  Frequency: constant\par The pain starts in the left hip and radiates to the lateral thigh.The pain is worse with lay on the left side and with sitting in a chair for more than twenty minutes\par She has been out of work since July 13, 2017 and returned to work on 9/2017\par \par She denies fatigue at the end of the day. She denies dysphagia. Her condition is not worsened with change in room temperature. She denies fever.   She denies bug bites. She denies diplopia.\par \par She takes Naproxen 500 mg po 2 x day\par The medrol dose pack only reduced her pain for two weeks.\par \par MRI of the lumbar spine reveals  toward based 5 mm disc herniation at L4/L5 and L5/S1  with  impingement  on the  thecal sac.\par \par EMG/NCV of the lower extremities reveals Right S1 radiculopathy.\par \par x-ray of the lumbar and thoracic spine and are negative.\par \par Blood work for a rheumatology screen 2016 was negative.\par \par left  hip pain\par Pain 7/10 Worse 10/10 Quality: aches\par The pain starts layteral aspect of the left hip without radiation.\par The pain is worse with lay on the left hip, sitting or standing more than 20 minutes\par Xrays of the right hip is negative. \par Naproxen 500 mg po 2 x day to three times a day. \par \par right foot pain\par pain 9/10 Worst 10/10 Quality; dull ache\par \par The pain starts on the bottom foot. The pain is worse with standing during the day.

## 2021-07-04 LAB
ANA SER IF-ACNC: NEGATIVE
CCP AB SER IA-ACNC: <8 UNITS
DSDNA AB SER-ACNC: <12 IU/ML
RF+CCP IGG SER-IMP: NEGATIVE

## 2021-08-04 NOTE — ED CDU PROVIDER INITIAL DAY NOTE - DATE/TIME 3
How Severe Is Your Skin Lesion?: moderate
Have Your Skin Lesions Been Treated?: not been treated
Is This A New Presentation, Or A Follow-Up?: Skin Lesions
17-May-2019 18:19

## 2021-10-05 NOTE — ED ADULT NURSE REASSESSMENT NOTE - NEURO ASSESSMENT
Patient History

The patient states she has not had a clinical breast exam in over

a year.

Patient is postmenopausal.

No known family history of cancer.

Took hormonal contraceptives for 7 years.  Took unspecified 

hormones for 3 years.

5 lb unintentional weight gain.

Pfizer vaccines 1/30/21 left arm. 2/20/21 left arm.

Patient states no breast complaints today. Patient has signed MRS

History Sheet.

 

Digital Woman Screen Mammo: October 5, 2021 - Exam #: 

[id number]

Bilateral CC and MLO view(s) were taken.

 

Technologist: RT Say

Prior study comparison: September 30, 2020, bilateral digital 

woman screen mammo performed at [facility] and 

[facility].  September 20, 2019, bilateral digital mammo 

screening bilat, performed at [facility].  

September 19, 2018, bilateral digital mammo screening bilat, 

performed at [facility].

 

FINDINGS: There are scattered fibroglandular densities.  The 

Volpara volumetric breast density category is:B.  There has been 

no change in the appearance of the mammogram from the prior 

studies.  There is a mild amount of scattered fibroglandular 

density which is fairly symmetric. There is no interval 

development of dominant mass, architectural distortion, or 

grouped microcalcification suggestive of malignancy.

3-D tomosynthesis shows no additional findings.

 

Assessment: BI-RADS/ACR category 1 mammogram. Negative Mammogram.

 

Recommendation

Routine screening mammogram of both breasts in 1 year (for women 

over age 40).

This patient's  [facility] Lifetime Breast Cancer Risk is 

estimated at 2.2 %.

This mammogram was interpreted with the aid of an FDA-approved 

computer-aided dectection system.

 

Electronically Signed By: Joey Doe MD 10/05/21 0832
- - -
- - -

## 2021-10-08 NOTE — ED ADULT NURSE NOTE - NS ED STAT RN HAND OFF 2
Caller: Georgia Garcia    Relationship: Emergency Contact    Best call back number: [phone]    CALLING IN BLOOD SUGAR READINGS    IN THE MORNINGS - ABOUT 180    IN THE EVENINGS - BETWEEN [id number]     Additional handoff

## 2021-12-08 ENCOUNTER — APPOINTMENT (OUTPATIENT)
Dept: PHYSICAL MEDICINE AND REHAB | Facility: CLINIC | Age: 41
End: 2021-12-08
Payer: MEDICAID

## 2021-12-08 VITALS
HEART RATE: 80 BPM | TEMPERATURE: 98.4 F | WEIGHT: 199 LBS | DIASTOLIC BLOOD PRESSURE: 84 MMHG | SYSTOLIC BLOOD PRESSURE: 120 MMHG | HEIGHT: 66 IN | BODY MASS INDEX: 31.98 KG/M2 | RESPIRATION RATE: 18 BRPM | OXYGEN SATURATION: 99 %

## 2021-12-08 DIAGNOSIS — M25.552 PAIN IN LEFT HIP: ICD-10-CM

## 2021-12-08 PROCEDURE — 99214 OFFICE O/P EST MOD 30 MIN: CPT

## 2021-12-08 RX ORDER — METHYLPREDNISOLONE 4 MG/1
4 TABLET ORAL
Qty: 1 | Refills: 0 | Status: ACTIVE | COMMUNITY
Start: 2021-12-08 | End: 1900-01-01

## 2021-12-08 NOTE — HISTORY OF PRESENT ILLNESS
[FreeTextEntry1] : 41 year old female presents right heel right knee and low back pain\par She states that she developed worsening pain in her right foot focused over the heel.\par \par right foot pain\par pain 10/10 Worst 10/10 Quality; dull ache\par The pain is stabbing over the anterior right heel.\par The pain is worse with weightbear\par \par Right knee pain\par Pain 9/10. Quality shooting burning and aching.  \par The pain has been worse because she has been walking to keep pressure off her right heel.  The pain starts  in the knee with radiation to the thigh. The pain is least in the morning and the pain worsens during the day. Her knee is worse with standing more than 20 minutes or go up and down stairs. She denies recent fall or accidents\par \par Low Back pain 8/10.  Worst 10/10  Quality: stabbing. Frequency: on/off. \par \par Her back pain is aggravated due to her poor walking mechanics the pain starts in the left low back and radiates to the left calf. Pain is aggravated with walking with 1 blocks. The pain is aggravated with bending at the hips. No bowel or bladder difficulty.\par \par Right elbow pain\par Pain is 2/10 quality: stabbing\par the pain starts over the lateral elbow that is worse with  of the right hand. The Pain is worse with touch Over the lateral elbow. No gross deformities..\par \par Left hip pain\par Pain:  0/10 \par \par She takes Naproxen 500 mg po 2 x day\par The medrol dose pack only reduced her pain for two weeks.\par \par MRI of the lumbar spine reveals  toward based 5 mm disc herniation at L4/L5 and L5/S1  with  impingement  on the  thecal sac.\par \par EMG/NCV of the lower extremities reveals Right S1 radiculopathy.\par \par x-ray of the lumbar and thoracic spine and are negative.\par \par Blood work for a rheumatology screen 2016 was negative.\par Blood work for rheumatology screen performed in 2021 revealed a mild elevated ESR\par \par Xrays of the right hip is negative. \par \par \par \par The pain starts on the bottom foot. The pain is worse with standing during the day.

## 2021-12-08 NOTE — PHYSICAL EXAM
[FreeTextEntry1] : General:\par     well developed, well nourished, in no mild distress.  Alert and oriented x 3. Pleasant. English\par \par Neck:\par     FAROM\par Chest Wall:\par     no deformities \par Lungs:\par     clear bilaterally to auscultation.  \par Heart:\par     non-displaced PMI, chest non-tender; regular rate and rhythm, S1, S2 without murmurs, rubs, or gallops\par Abdomen:\par      normal bowel sounds; no hepatosplenomegaly no ventral,umbilical hernias or masses noted.  \par Msk:\par     Thoracic spine is nontender\par \par  Lumbar spine she is tenderness in the bilateral SI joints.  Spasm of the lower lumbar paraspinals L4-S1. Flexion 0-80\par Pulses:\par     pulses normal in all 4 extremities.  \par Extremities:\par     LUE: Shoulder flexion 0-180 abduction 0-180 MS 5-/5\par Elbow FAROM, MS 5/5 reflexes 2/4 \par Wrist FAROM, MS 5/5 reflexes 2/4\par sensation is intact to light touch, pinprick and proprioception\par \par RUE: Shoulder flexion 0-180 abduction 0-180 MS 5-/5\par Elbow FAROM, MS 5-/5 reflexes 2/4 Tender of the right elbow\par Wrist FAROM, MS 5-/5 reflexes 2/4\par sensation is intact to light touch, pinprick and proprioception\par \par LLE: Hip FAROM, MS 5-/5  tender over the lateral hip bursa\par knee: FAROM, MS 5-/5 Reflexes 2/4\par ankle: FAROM, MS 5-/5 reflexes 2/4\par sensation is intact to light touch, pinprick and proprioception.  no numbness over anterior lateral thigh\par  negative FABERs, negative FADIRs, Negative SLRs \par \par RLE: Hip FAROM, MS 5-/5 \par knee: FAROM, MS  3/5 Reflexes 2/4 right knee crepitus + medial mcmuurays\par ankle: FAROM, MS 5-/5 reflexes 2/4\par Tender on palpation over the anterior heel\par sensation is intact to light touch, pinprick and proprioception.  no numbness over anterior lateral thigh\par  negative FABERs, negative FADIRs,Negative SLRs

## 2022-01-05 ENCOUNTER — APPOINTMENT (OUTPATIENT)
Dept: PHYSICAL MEDICINE AND REHAB | Facility: CLINIC | Age: 42
End: 2022-01-05

## 2022-01-13 NOTE — ED ADULT NURSE NOTE - NS ED NURSE DISCH DISPOSITION
[FreeTextEntry1] : Patient had a throat discomfort treated with antibiotics improved still feels that there is something in her throat clearing her throat all the time.  She is here for evaluation orally everything looks fine no infection no erythema no tonsillitis fiberoptic evaluation of her larynx showed perfectly normal laryngeal exam no evidence of any significant reflux no tumors or masses she was reassured a lot of thick secretions have encouraged her to use Flonase nasal spray which will help her significantly.  No further acute interventions indicated at this time.  All of her questions and her daughter who was with her were answered.
Discharged

## 2022-02-17 ENCOUNTER — APPOINTMENT (OUTPATIENT)
Dept: INTERNAL MEDICINE | Facility: CLINIC | Age: 42
End: 2022-02-17
Payer: MEDICAID

## 2022-02-17 VITALS
TEMPERATURE: 97.1 F | RESPIRATION RATE: 17 BRPM | SYSTOLIC BLOOD PRESSURE: 126 MMHG | BODY MASS INDEX: 32.14 KG/M2 | HEART RATE: 84 BPM | HEIGHT: 66 IN | DIASTOLIC BLOOD PRESSURE: 84 MMHG | OXYGEN SATURATION: 99 % | WEIGHT: 200 LBS

## 2022-02-17 DIAGNOSIS — R42 DIZZINESS AND GIDDINESS: ICD-10-CM

## 2022-02-17 DIAGNOSIS — F32.A DEPRESSION, UNSPECIFIED: ICD-10-CM

## 2022-02-17 DIAGNOSIS — R51.9 HEADACHE, UNSPECIFIED: ICD-10-CM

## 2022-02-17 PROCEDURE — 99214 OFFICE O/P EST MOD 30 MIN: CPT | Mod: 25

## 2022-02-19 LAB
ALBUMIN SERPL ELPH-MCNC: 4.8 G/DL
ALP BLD-CCNC: 71 U/L
ALT SERPL-CCNC: 15 U/L
ANION GAP SERPL CALC-SCNC: 13 MMOL/L
AST SERPL-CCNC: 18 U/L
BASOPHILS # BLD AUTO: 0.03 K/UL
BASOPHILS NFR BLD AUTO: 0.3 %
BILIRUB SERPL-MCNC: 0.9 MG/DL
BUN SERPL-MCNC: 10 MG/DL
CALCIUM SERPL-MCNC: 9.1 MG/DL
CHLORIDE SERPL-SCNC: 102 MMOL/L
CO2 SERPL-SCNC: 25 MMOL/L
CREAT SERPL-MCNC: 0.77 MG/DL
EOSINOPHIL # BLD AUTO: 0.06 K/UL
EOSINOPHIL NFR BLD AUTO: 0.7 %
FERRITIN SERPL-MCNC: 40 NG/ML
FOLATE SERPL-MCNC: 13.9 NG/ML
GLUCOSE SERPL-MCNC: 74 MG/DL
HCT VFR BLD CALC: 38.1 %
HGB BLD-MCNC: 12.1 G/DL
IMM GRANULOCYTES NFR BLD AUTO: 0.3 %
IRON SATN MFR SERPL: 12 %
IRON SERPL-MCNC: 41 UG/DL
LYMPHOCYTES # BLD AUTO: 2.91 K/UL
LYMPHOCYTES NFR BLD AUTO: 32.6 %
MAN DIFF?: NORMAL
MCHC RBC-ENTMCNC: 29.7 PG
MCHC RBC-ENTMCNC: 31.8 GM/DL
MCV RBC AUTO: 93.4 FL
MONOCYTES # BLD AUTO: 0.82 K/UL
MONOCYTES NFR BLD AUTO: 9.2 %
NEUTROPHILS # BLD AUTO: 5.09 K/UL
NEUTROPHILS NFR BLD AUTO: 56.9 %
PLATELET # BLD AUTO: 451 K/UL
POTASSIUM SERPL-SCNC: 3.9 MMOL/L
PROT SERPL-MCNC: 7.9 G/DL
RBC # BLD: 4.08 M/UL
RBC # FLD: 13.2 %
SODIUM SERPL-SCNC: 140 MMOL/L
TIBC SERPL-MCNC: 344 UG/DL
TSH SERPL-ACNC: 1.14 UIU/ML
UIBC SERPL-MCNC: 303 UG/DL
VIT B12 SERPL-MCNC: 785 PG/ML
WBC # FLD AUTO: 8.94 K/UL

## 2022-02-19 NOTE — HISTORY OF PRESENT ILLNESS
[Family Member] : family member [FreeTextEntry1] : Sick visit [de-identified] : Patient presents accompanied by her son Esequiel.  Patient complaining of frequent headaches and dizziness for the last few month moderate intermittent daily.  Patient admits to very poor sleep sleeps few hours at night with interrupted sleep.  Patient admits to depression.  Patient has had symptoms of depression for the last few years.  Has consulted with [facility].  Patient was prescribed medication however did not want to go ahead with that.  Started therapy however felt like when connected to her therapist.  Patient denies homicidal suicidal ideations anxiety.  Patient admits to having a lot of challenges with her son which contributes to her symptoms of depression.

## 2022-02-19 NOTE — PHYSICAL EXAM
[Normal] : normal gait, coordination grossly intact, no focal deficits and deep tendon reflexes were 2+ and symmetric [de-identified] : Goiter [de-identified] : Patient appears upset started crying

## 2022-02-22 ENCOUNTER — NON-APPOINTMENT (OUTPATIENT)
Age: 42
End: 2022-02-22

## 2022-03-03 DIAGNOSIS — M79.671 PAIN IN RIGHT FOOT: ICD-10-CM

## 2022-05-02 ENCOUNTER — APPOINTMENT (OUTPATIENT)
Dept: INTERNAL MEDICINE | Facility: CLINIC | Age: 42
End: 2022-05-02
Payer: MEDICAID

## 2022-05-02 VITALS
HEART RATE: 67 BPM | BODY MASS INDEX: 32.3 KG/M2 | HEIGHT: 66 IN | TEMPERATURE: 99.2 F | OXYGEN SATURATION: 98 % | SYSTOLIC BLOOD PRESSURE: 126 MMHG | RESPIRATION RATE: 18 BRPM | WEIGHT: 201 LBS | DIASTOLIC BLOOD PRESSURE: 80 MMHG

## 2022-05-02 DIAGNOSIS — J06.9 ACUTE UPPER RESPIRATORY INFECTION, UNSPECIFIED: ICD-10-CM

## 2022-05-02 PROCEDURE — 99214 OFFICE O/P EST MOD 30 MIN: CPT

## 2022-05-02 RX ORDER — AZITHROMYCIN 250 MG/1
250 TABLET, FILM COATED ORAL
Qty: 1 | Refills: 0 | Status: ACTIVE | COMMUNITY
Start: 2022-05-02 | End: 1900-01-01

## 2022-05-04 LAB
RAPID RVP RESULT: NOT DETECTED
SARS-COV-2 RNA PNL RESP NAA+PROBE: NOT DETECTED

## 2022-05-09 PROBLEM — J06.9 URI, ACUTE: Status: RESOLVED | Noted: 2022-05-02 | Resolved: 2022-06-01

## 2022-05-09 NOTE — PHYSICAL EXAM
[No Acute Distress] : no acute distress [Well Nourished] : well nourished [Well Developed] : well developed [Well-Appearing] : well-appearing [Normal Sclera/Conjunctiva] : normal sclera/conjunctiva [PERRL] : pupils equal round and reactive to light [EOMI] : extraocular movements intact [Normal Outer Ear/Nose] : the outer ears and nose were normal in appearance [Normal Oropharynx] : the oropharynx was normal [No JVD] : no jugular venous distention [No Lymphadenopathy] : no lymphadenopathy [Supple] : supple [Thyroid Normal, No Nodules] : the thyroid was normal and there were no nodules present [No Respiratory Distress] : no respiratory distress  [No Accessory Muscle Use] : no accessory muscle use [Normal Rate] : normal rate  [Regular Rhythm] : with a regular rhythm [Normal S1, S2] : normal S1 and S2 [No Murmur] : no murmur heard [No Carotid Bruits] : no carotid bruits [No Abdominal Bruit] : a ~M bruit was not heard ~T in the abdomen [No Varicosities] : no varicosities [Pedal Pulses Present] : the pedal pulses are present [No Edema] : there was no peripheral edema [No Palpable Aorta] : no palpable aorta [No Extremity Clubbing/Cyanosis] : no extremity clubbing/cyanosis [Soft] : abdomen soft [Non Tender] : non-tender [Non-distended] : non-distended [No Masses] : no abdominal mass palpated [No HSM] : no HSM [Normal Bowel Sounds] : normal bowel sounds [Normal Posterior Cervical Nodes] : no posterior cervical lymphadenopathy [Normal Anterior Cervical Nodes] : no anterior cervical lymphadenopathy [No CVA Tenderness] : no CVA  tenderness [No Spinal Tenderness] : no spinal tenderness [No Joint Swelling] : no joint swelling [Grossly Normal Strength/Tone] : grossly normal strength/tone [No Rash] : no rash [Coordination Grossly Intact] : coordination grossly intact [No Focal Deficits] : no focal deficits [Normal Gait] : normal gait [Deep Tendon Reflexes (DTR)] : deep tendon reflexes were 2+ and symmetric [Normal Affect] : the affect was normal [Normal Insight/Judgement] : insight and judgment were intact [de-identified] : + coarse breath sounds / B/L

## 2022-05-09 NOTE — REVIEW OF SYSTEMS
[Postnasal Drip] : postnasal drip [Cough] : cough [Fever] : no fever [Chills] : no chills [Discharge] : no discharge [Vision Problems] : no vision problems [Earache] : no earache [Hearing Loss] : no hearing loss [Sore Throat] : no sore throat [Chest Pain] : no chest pain [Palpitations] : no palpitations [Shortness Of Breath] : no shortness of breath [Wheezing] : no wheezing [Dyspnea on Exertion] : no dyspnea on exertion [Abdominal Pain] : no abdominal pain [Nausea] : no nausea [Constipation] : no constipation [Diarrhea] : diarrhea [Vomiting] : no vomiting [Heartburn] : no heartburn [Melena] : no melena [Dysuria] : no dysuria [Incontinence] : no incontinence [Hematuria] : no hematuria [Frequency] : no frequency [Joint Pain] : no joint pain [Muscle Pain] : no muscle pain [Itching] : no itching [Skin Rash] : no skin rash [Headache] : no headache [Dizziness] : no dizziness

## 2022-05-12 NOTE — ED ADULT TRIAGE NOTE - SOURCE OF INFORMATION
[facility] Transitions Follow Up Call    2022    Patient: Sergio Para  Patient : 1948   MRN: [id number]  Reason for Admission: Multifocal PNE  Discharge Date: 22 RARS: Readmission Risk Score: 18.8 ( )       Attempted to reach patient for subsequent transitional call. VM left to return call to [phone]. 1st attempt. Follow Up  Future Appointments   Date Time Provider Marietta Leon   2022  8:45 AM [facility]   2022  2:30 PM MD Tiff Bianchi C.D.   2022 11:00 AM [facility]   2023 10:00 AM [facility]       Cheryl Olson RN
Patient

## 2022-06-07 NOTE — ED PROVIDER NOTE - NSCAREINITIATED _GEN_ER
D/c order received, pt verbalized agreement to d/c home. D/c instructions prepared and given to pt, pt VU. Medication information packet given r/t NEW and/or CHANGED prescriptions, pt VU. Pt belongings gathered, IV removed without complication. [facility]position is home[facility]      Electronically signed by Abrahan Jennings RN on 6/7/2022 at 3:51 PM Damaris Vincent(Resident)

## 2022-06-16 ENCOUNTER — APPOINTMENT (OUTPATIENT)
Dept: PHYSICAL MEDICINE AND REHAB | Facility: CLINIC | Age: 42
End: 2022-06-16
Payer: MEDICAID

## 2022-06-16 VITALS
HEART RATE: 68 BPM | WEIGHT: 199 LBS | TEMPERATURE: 98.6 F | DIASTOLIC BLOOD PRESSURE: 80 MMHG | BODY MASS INDEX: 31.98 KG/M2 | OXYGEN SATURATION: 99 % | RESPIRATION RATE: 18 BRPM | SYSTOLIC BLOOD PRESSURE: 132 MMHG | HEIGHT: 66 IN

## 2022-06-16 DIAGNOSIS — Q68.0 CONGENITAL DEFORMITY OF STERNOCLEIDOMASTOID MUSCLE: ICD-10-CM

## 2022-06-16 DIAGNOSIS — M43.6 TORTICOLLIS: ICD-10-CM

## 2022-06-16 PROCEDURE — 99213 OFFICE O/P EST LOW 20 MIN: CPT

## 2022-06-16 RX ORDER — METHOCARBAMOL 500 MG/1
500 TABLET, FILM COATED ORAL
Qty: 30 | Refills: 0 | Status: ACTIVE | COMMUNITY
Start: 2022-06-16 | End: 1900-01-01

## 2022-06-19 PROBLEM — M43.6 RIGHT TORTICOLLIS: Status: ACTIVE | Noted: 2022-06-16

## 2022-06-19 NOTE — PHYSICAL EXAM
[FreeTextEntry1] : General:\par     well developed, well nourished, in no mild distress.  Alert and oriented x 3. Pleasant. English\par \par Neck:\par Active range of motion: To the left 0 to 40 degrees to the right 0 to 30 degrees.  Tender over the right sternocleidomastoid muscle.\par Chest Wall:\par     no deformities \par Lungs:\par     clear bilaterally to auscultation.  \par Heart:\par     non-displaced PMI, chest non-tender; regular rate and rhythm, S1, S2 without murmurs, rubs, or gallops\par Abdomen:\par      normal bowel sounds; no hepatosplenomegaly no ventral,umbilical hernias or masses noted.  \par Msk:\par     Thoracic spine is nontender\par \par  Lumbar spine she is tenderness in the bilateral SI joints.  Spasm of the lower lumbar paraspinals L4-S1. Flexion 0-80\par Pulses:\par     pulses normal in all 4 extremities.  \par Extremities:\par     LUE: Shoulder flexion 0-180 abduction 0-180 MS 5-/5\par Elbow FAROM, MS 5/5 reflexes 2/4 \par Wrist FAROM, MS 5/5 reflexes 2/4\par sensation is intact to light touch, pinprick and proprioception\par \par RUE: Shoulder flexion 0-180 abduction 0-180 MS 5-/5\par Elbow FAROM, MS 5-/5 reflexes 2/4 Tender of the right elbow\par Wrist FAROM, MS 5-/5 reflexes 2/4\par sensation is intact to light touch, pinprick and proprioception\par \par LLE: Hip FAROM, MS 5-/5  tender over the lateral hip bursa\par knee: FAROM, MS 5-/5 Reflexes 2/4\par ankle: FAROM, MS 5-/5 reflexes 2/4\par sensation is intact to light touch, pinprick and proprioception.  no numbness over anterior lateral thigh\par  negative FABERs, negative FADIRs, Negative SLRs \par \par RLE: Hip FAROM, MS 5-/5 \par knee: FAROM, MS  3/5 Reflexes 2/4 right knee crepitus + medial mcmuurays\par ankle: FAROM, MS 5-/5 reflexes 2/4\par Tender on palpation over the anterior heel stretching the plantar fascia increases her foot pain\par sensation is intact to light touch, pinprick and proprioception.  no numbness over anterior lateral thigh\par  negative FABERs, negative FADIRs,Negative SLRs

## 2022-06-19 NOTE — HISTORY OF PRESENT ILLNESS
[FreeTextEntry1] : The patient was last seen by me on December 8, 2021\par \par 41 year old female presents right heel, right knee and low back pain.  She presents with a new complaint of neck pain.\par The patient denies a fall or accident\par She recalls initially performing therapies and then stopping because she felt better\par \par right foot pain\par pain  6/10 Worst 10/10 Quality; dull ache\par She continues to have a stabbing sensation over the anterior right heel.\par The pain is worse with weightbear.  The pain is worse in the morning.\par \par Low Back pain 9/10.  Worst 10/10  Quality: stabbing. Frequency: on/off. \par Exacerbation of low back pain\par The pain starts in the middle of the back and radiates to the neck\par Bending and standing aggravates her pain\par She denies bowel or bladder difficulty\par \par Right knee pain\par Pain 1/10. Quality mild ache\par Her right knee pain is much improved\par \par She takes Naproxen 500 mg po 2 x day\par The medrol dose pack only reduced her pain for two weeks.\par \par

## 2022-06-19 NOTE — DATA REVIEWED
[Plain X-Rays] : plain X-Rays [EMG] : EMG [MRI] : MRI [Other: ___] : [unfilled] [FreeTextEntry1] : MRI of the lumbar spine reveals  toward based 5 mm disc herniation at L4/L5 and L5/S1  with  impingement  on the  thecal sac.\par \par EMG/NCV of the lower extremities reveals Right S1 radiculopathy.\par \par x-ray of the lumbar and thoracic spine and are negative.\par \par Blood work for a rheumatology screen 2016 was negative.\par Blood work for rheumatology screen performed in 2021 revealed a mild elevated ESR\par \par Xrays of the right hip is negative. \par

## 2022-06-30 ENCOUNTER — APPOINTMENT (OUTPATIENT)
Dept: PHYSICAL MEDICINE AND REHAB | Facility: CLINIC | Age: 42
End: 2022-06-30

## 2022-06-30 VITALS
RESPIRATION RATE: 18 BRPM | SYSTOLIC BLOOD PRESSURE: 130 MMHG | OXYGEN SATURATION: 99 % | BODY MASS INDEX: 33.15 KG/M2 | TEMPERATURE: 98.4 F | WEIGHT: 199 LBS | DIASTOLIC BLOOD PRESSURE: 90 MMHG | HEIGHT: 65 IN | HEART RATE: 103 BPM

## 2022-06-30 DIAGNOSIS — M54.50 LOW BACK PAIN, UNSPECIFIED: ICD-10-CM

## 2022-06-30 DIAGNOSIS — M72.2 PLANTAR FASCIAL FIBROMATOSIS: ICD-10-CM

## 2022-06-30 DIAGNOSIS — M54.2 CERVICALGIA: ICD-10-CM

## 2022-06-30 DIAGNOSIS — M77.11 LATERAL EPICONDYLITIS, RIGHT ELBOW: ICD-10-CM

## 2022-06-30 DIAGNOSIS — M54.6 PAIN IN THORACIC SPINE: ICD-10-CM

## 2022-06-30 DIAGNOSIS — M17.11 UNILATERAL PRIMARY OSTEOARTHRITIS, RIGHT KNEE: ICD-10-CM

## 2022-06-30 PROCEDURE — 99215 OFFICE O/P EST HI 40 MIN: CPT

## 2022-06-30 RX ORDER — METHYLPREDNISOLONE 4 MG/1
4 TABLET ORAL
Qty: 1 | Refills: 0 | Status: ACTIVE | COMMUNITY
Start: 2022-06-30 | End: 1900-01-01

## 2022-07-01 PROBLEM — M17.11 OSTEOARTHRITIS OF RIGHT KNEE: Status: ACTIVE | Noted: 2021-06-11

## 2022-07-01 PROBLEM — M54.2 NECK PAIN: Status: ACTIVE | Noted: 2022-06-30

## 2022-07-01 PROBLEM — M54.6 THORACIC SPINE PAIN: Status: ACTIVE | Noted: 2022-06-30

## 2022-07-01 PROBLEM — M77.11 LATERAL EPICONDYLITIS OF RIGHT ELBOW: Status: ACTIVE | Noted: 2021-06-11

## 2022-07-01 PROBLEM — M54.50 LOW BACK PAIN: Status: ACTIVE | Noted: 2021-06-11

## 2022-07-01 PROBLEM — M72.2 PLANTAR FASCIITIS: Status: ACTIVE | Noted: 2021-06-11

## 2022-07-01 NOTE — PHYSICAL EXAM
Please clarify with patient I see lipitor on her meds list ; if taking lipitor then simvastatin is not needed    [FreeTextEntry1] : General:\par     well developed, well nourished, in no mild distress.  Alert and oriented x 3. Pleasant. English\par \par Neck:\par Active range of motion: To the left 0 to 40 degrees to the right 0 to 30 degrees.  Tender over the right sternocleidomastoid muscle.\par Chest Wall:\par     no deformities \par Lungs:\par     clear bilaterally to auscultation.  \par Heart:\par     non-displaced PMI, chest non-tender; regular rate and rhythm, S1, S2 without murmurs, rubs, or gallops\par Abdomen:\par      normal bowel sounds; no hepatosplenomegaly no ventral,umbilical hernias or masses noted.  \par Msk:\par     Thoracic spine is mild tender to palpation\par \par  Lumbar spine she is tenderness in the bilateral SI joints.  Spasm of the lower lumbar paraspinals L4-S1. Flexion 0-80\par Pulses:\par     pulses normal in all 4 extremities.  \par Extremities:\par     LUE: Shoulder flexion 0-180 abduction 0-180 MS 5-/5\par Elbow FAROM, MS 5/5 reflexes 2/4 \par Wrist FAROM, MS 5/5 reflexes 2/4\par sensation is intact to light touch, pinprick and proprioception\par \par RUE: Shoulder flexion 0-180 abduction 0-180 MS 5-/5\par Elbow FAROM, MS 5-/5 reflexes 2/4 Tender of the right elbow\par Wrist FAROM, MS 5-/5 reflexes 2/4\par sensation is intact to light touch, pinprick and proprioception\par \par LLE: Hip FAROM, MS 5-/5  tender over the lateral hip bursa\par knee: FAROM, MS 5-/5 Reflexes 2/4\par ankle: FAROM, MS 5-/5 reflexes 2/4\par sensation is intact to light touch, pinprick and proprioception.  no numbness over anterior lateral thigh\par  negative FABERs, negative FADIRs, Negative SLRs \par \par RLE: Hip FAROM, MS 5-/5 \par knee: FAROM, MS  3/5 Reflexes 2/4 right knee crepitus + medial mcmuurays\par ankle: FAROM, MS 5-/5 reflexes 2/4\par Tender on palpation over the anterior heel stretching the plantar fascia increases her foot pain\par sensation is intact to light touch, pinprick and proprioception.  no numbness over anterior lateral thigh\par  negative FABERs, negative FADIRs,Negative SLRs

## 2022-07-01 NOTE — HISTORY OF PRESENT ILLNESS
[FreeTextEntry1] : The patient was last seen 2 weeks ago.\par Two week follow up \par \par 43 41-year-old female was seen for right foot pain, low back pain and right knee pain.  Today she presents with increased pain in her neck right elbow and upper back.  She denies of fall.  She has not started physical therapy.\par \par right foot pain\par pain  6/10 Worst 10/10 Quality; dull ache\par She continues to have a stabbing sensation over the anterior right heel.\par The pain is worse with weightbear.  The pain is worse in the morning.\par \par Low Back pain 9/10.  Worst 10/10  Quality: stabbing. Frequency: on/off. \par Exacerbation of low back pain\par The pain starts in the middle of the back and radiates to the neck\par Bending and standing aggravates her pain\par She denies bowel or bladder difficulty\par \par Right knee pain\par Pain 4/10. Quality mild ache\par The pain is a mild ache which becomes burning with prolonged walking.  Stairs is difficult due to knee pain.\par \par Neck pain\par Pain:  8/10 Worse: 10/10 Quality: sharp  Frequency: constant\par The pain starts at the base of the skull radiates to the shoulders.  No radiation down the arms.\par \par right elbow\par Pain:  8/10 Worse: 10/10 Quality: sharp  Frequency: constant\par The pain is a burning sensation in the outside of the elbow.  The pain is aggravated with .\par \par upper back pain\par Pain:  8/10 Worse: 10/10 Quality: sharp  Frequency: constant\par The pain starts at the base of the neck and radiates between the shoulder blades to the low back\par \par \par

## 2022-07-12 NOTE — ED ADULT NURSE NOTE - NS ED NURSE LEVEL OF CONSCIOUSNESS ORIENTATION
Elieser Irvin is a 23year old male presenting to the Urgent Care today for right ear pain, drainage, (gray-tan) a lot of drainage this AM. Pt ruptured his ear drum in May. Was prescribed ABX and was referred to ENT. Pt has an appt in 2 weeks. Been dealing with pain and off since then. Pt states this AM it got worse. Denies any URI symptoms besides some body aches. OTC use: None     Allergies and Medications were reviewed and updated if necessary. Pharmacy reviewed and updated to Patient's preference. Patient would like communication of their results via:        Cell Phone:   Telephone Information:   Mobile [phone] to leave a message containing results? Yes   Can use [facility] samantha as well for results    Patient advised staff will contact with positive results only. Patient agrees. Patient instructed can also view results on [facility]. Writer in PPE: Gown, gloves, N95/level 3 mask, face shield, and hair covering during patient contact. Pt in mask during rooming. Oriented - self; Oriented - place; Oriented - time

## 2022-07-19 ENCOUNTER — APPOINTMENT (OUTPATIENT)
Dept: PHYSICAL MEDICINE AND REHAB | Facility: CLINIC | Age: 42
End: 2022-07-19

## 2022-10-31 ENCOUNTER — RX RENEWAL (OUTPATIENT)
Age: 42
End: 2022-10-31

## 2022-10-31 RX ORDER — IBUPROFEN 600 MG/1
600 TABLET, FILM COATED ORAL 3 TIMES DAILY
Qty: 60 | Refills: 0 | Status: ACTIVE | COMMUNITY
Start: 2022-06-16 | End: 1900-01-01

## 2023-03-10 NOTE — ED ADULT NURSE NOTE - CAS EDN DISCHARGE INTERVENTIONS
Group Topic:  Education    Date: 3/9/2023  Start Time: 1930  End Time: 2000  Facilitators: MER Arias    Focus: Setting Boundaries and Limits  Number in attendance: 11    Method: Group and Handout  Attendance: Present  Participation: Refused  Patient Response: Quiet  Mood: Normal  Affect: Type: Euthymic (normal mood)   Range: Blunted/flat   Congruency: Congruent   Stability: Stable  Behavior/Socialization: Withdrawn  Thought Process: Unremarkable  Task Performance: Follows directions  Patient Evaluation: Attends - no participation       n/a

## 2023-05-16 NOTE — ED ADULT NURSE REASSESSMENT NOTE - GENERAL PATIENT STATE
comfortable appearance/smiling/interactive/cooperative
<-- Click to add NO significant Past Surgical History

## 2023-05-16 NOTE — ED ADULT TRIAGE NOTE - HEIGHT IN FEET
Call and let Kraig know results of  bilateral xray of knee done 5- \"degenerative change with mild narrowing medial joint compartment left greater than right with tiny marginal osteophytes\". He has already been referred to [facility] with chronic knee pain.
5

## 2023-08-22 ENCOUNTER — NON-APPOINTMENT (OUTPATIENT)
Age: 43
End: 2023-08-22

## 2023-08-22 ENCOUNTER — APPOINTMENT (OUTPATIENT)
Dept: INTERNAL MEDICINE | Facility: CLINIC | Age: 43
End: 2023-08-22
Payer: MEDICAID

## 2023-08-22 VITALS
WEIGHT: 209 LBS | OXYGEN SATURATION: 99 % | RESPIRATION RATE: 17 BRPM | DIASTOLIC BLOOD PRESSURE: 74 MMHG | HEIGHT: 66 IN | BODY MASS INDEX: 33.59 KG/M2 | HEART RATE: 94 BPM | SYSTOLIC BLOOD PRESSURE: 128 MMHG

## 2023-08-22 DIAGNOSIS — E66.9 OBESITY, UNSPECIFIED: ICD-10-CM

## 2023-08-22 DIAGNOSIS — E04.9 NONTOXIC GOITER, UNSPECIFIED: ICD-10-CM

## 2023-08-22 DIAGNOSIS — Z00.00 ENCOUNTER FOR GENERAL ADULT MEDICAL EXAMINATION W/OUT ABNORMAL FINDINGS: ICD-10-CM

## 2023-08-22 DIAGNOSIS — Z12.4 ENCOUNTER FOR SCREENING FOR MALIGNANT NEOPLASM OF CERVIX: ICD-10-CM

## 2023-08-22 PROCEDURE — 93000 ELECTROCARDIOGRAM COMPLETE: CPT

## 2023-08-22 PROCEDURE — G0447 BEHAVIOR COUNSEL OBESITY 15M: CPT

## 2023-08-22 PROCEDURE — 99396 PREV VISIT EST AGE 40-64: CPT | Mod: 25

## 2023-08-22 NOTE — COUNSELING
[Potential consequences of obesity discussed] : Potential consequences of obesity discussed [Benefits of weight loss discussed] : Benefits of weight loss discussed [Structured Weight Management Program suggested:] : Structured weight management program suggested [Encouraged to increase physical activity] : Encouraged to increase physical activity [Weigh Self Weekly] : weigh self weekly [Decrease Portions] : decrease portions [Keep Food Diary] : keep food diary [FreeTextEntry1] : My itness pal [FreeTextEntry4] : 16

## 2023-08-22 NOTE — HEALTH RISK ASSESSMENT
[Very Good] : ~his/her~  mood as very good [No falls in past year] : Patient reported no falls in the past year [0] : 2) Feeling down, depressed, or hopeless: Not at all (0) [PHQ-2 Negative - No further assessment needed] : PHQ-2 Negative - No further assessment needed [Patient reported PAP Smear was normal] : Patient reported PAP Smear was normal [With Family] : lives with family [Employed] : employed [Fully functional (bathing, dressing, toileting, transferring, walking, feeding)] : Fully functional (bathing, dressing, toileting, transferring, walking, feeding) [Fully functional (using the telephone, shopping, preparing meals, housekeeping, doing laundry, using] : Fully functional and needs no help or supervision to perform IADLs (using the telephone, shopping, preparing meals, housekeeping, doing laundry, using transportation, managing medications and managing finances) [Reports normal functional visual acuity (ie: able to read med bottle)] : Reports normal functional visual acuity [de-identified] : None [Change in mental status noted] : No change in mental status noted [Reports changes in hearing] : Reports no changes in hearing [Reports changes in vision] : Reports no changes in vision [Reports changes in dental health] : Reports no changes in dental health [PapSmearDate] : 1/2017

## 2023-08-22 NOTE — PHYSICAL EXAM
[No Acute Distress] : no acute distress [Well Nourished] : well nourished [Well Developed] : well developed [Well-Appearing] : well-appearing [Normal Sclera/Conjunctiva] : normal sclera/conjunctiva [PERRL] : pupils equal round and reactive to light [EOMI] : extraocular movements intact [Normal Outer Ear/Nose] : the outer ears and nose were normal in appearance [Normal Oropharynx] : the oropharynx was normal [No Lymphadenopathy] : no lymphadenopathy [Supple] : supple [No Respiratory Distress] : no respiratory distress  [No Accessory Muscle Use] : no accessory muscle use [Clear to Auscultation] : lungs were clear to auscultation bilaterally [Normal Rate] : normal rate  [Regular Rhythm] : with a regular rhythm [Normal S1, S2] : normal S1 and S2 [No Murmur] : no murmur heard [No Edema] : there was no peripheral edema [Soft] : abdomen soft [Non Tender] : non-tender [Non-distended] : non-distended [No Masses] : no abdominal mass palpated [No HSM] : no HSM [Normal Bowel Sounds] : normal bowel sounds [Normal Posterior Cervical Nodes] : no posterior cervical lymphadenopathy [Normal Anterior Cervical Nodes] : no anterior cervical lymphadenopathy [No CVA Tenderness] : no CVA  tenderness [No Spinal Tenderness] : no spinal tenderness [No Joint Swelling] : no joint swelling [Grossly Normal Strength/Tone] : grossly normal strength/tone [No Rash] : no rash [Coordination Grossly Intact] : coordination grossly intact [No Focal Deficits] : no focal deficits [Normal Gait] : normal gait [Normal Affect] : the affect was normal [Alert and Oriented x3] : oriented to person, place, and time [Normal Insight/Judgement] : insight and judgment were intact [de-identified] : thyromegally

## 2023-08-25 DIAGNOSIS — R82.90 UNSPECIFIED ABNORMAL FINDINGS IN URINE: ICD-10-CM

## 2023-08-25 LAB
25(OH)D3 SERPL-MCNC: 22.5 NG/ML
ALBUMIN SERPL ELPH-MCNC: 4.9 G/DL
ALP BLD-CCNC: 58 U/L
ALT SERPL-CCNC: 13 U/L
ANION GAP SERPL CALC-SCNC: 13 MMOL/L
APPEARANCE: ABNORMAL
AST SERPL-CCNC: 18 U/L
BACTERIA: ABNORMAL /HPF
BILIRUB SERPL-MCNC: 1.2 MG/DL
BILIRUBIN URINE: NEGATIVE
BLOOD URINE: ABNORMAL
BUN SERPL-MCNC: 11 MG/DL
CALCIUM SERPL-MCNC: 9.5 MG/DL
CAST: 3 /LPF
CHLORIDE SERPL-SCNC: 100 MMOL/L
CHOLEST SERPL-MCNC: 196 MG/DL
CO2 SERPL-SCNC: 23 MMOL/L
COLOR: YELLOW
CREAT SERPL-MCNC: 0.82 MG/DL
EGFR: 90 ML/MIN/1.73M2
EPITHELIAL CELLS: >36 /HPF
ESTIMATED AVERAGE GLUCOSE: 117 MG/DL
FOLATE SERPL-MCNC: 15 NG/ML
GLUCOSE QUALITATIVE U: NEGATIVE MG/DL
GLUCOSE SERPL-MCNC: 89 MG/DL
HBA1C MFR BLD HPLC: 5.7 %
HBV SURFACE AB SER QL: NONREACTIVE
HDLC SERPL-MCNC: 60 MG/DL
KETONES URINE: ABNORMAL MG/DL
LDLC SERPL CALC-MCNC: 117 MG/DL
LEUKOCYTE ESTERASE URINE: ABNORMAL
M TB IFN-G BLD-IMP: NEGATIVE
MEV IGG FLD QL IA: 23.5 AU/ML
MEV IGG+IGM SER-IMP: POSITIVE
MICROSCOPIC-UA: NORMAL
MUV AB SER-ACNC: POSITIVE
MUV IGG SER QL IA: 247 AU/ML
NITRITE URINE: NEGATIVE
NONHDLC SERPL-MCNC: 136 MG/DL
PH URINE: 5.5
POTASSIUM SERPL-SCNC: 4.1 MMOL/L
PROT SERPL-MCNC: 7.7 G/DL
PROTEIN URINE: 30 MG/DL
QUANTIFERON TB PLUS MITOGEN MINUS NIL: 1.9 IU/ML
QUANTIFERON TB PLUS NIL: 0.01 IU/ML
QUANTIFERON TB PLUS TB1 MINUS NIL: 0 IU/ML
QUANTIFERON TB PLUS TB2 MINUS NIL: 0 IU/ML
RED BLOOD CELLS URINE: 9 /HPF
RUBV IGG FLD-ACNC: 7.6 INDEX
RUBV IGG SER-IMP: POSITIVE
SODIUM SERPL-SCNC: 136 MMOL/L
SPECIFIC GRAVITY URINE: 1.03
TRIGL SERPL-MCNC: 107 MG/DL
TSH SERPL-ACNC: 1 UIU/ML
UROBILINOGEN URINE: 0.2 MG/DL
VIT B12 SERPL-MCNC: 913 PG/ML
VZV AB TITR SER: POSITIVE
VZV IGG SER IF-ACNC: 851.1 INDEX
WHITE BLOOD CELLS URINE: 13 /HPF

## 2023-09-16 NOTE — ED CDU PROVIDER INITIAL DAY NOTE - OBSERVATION MONITORING PLAN
ED Record Reviewed [facility]  Hospitalist Discharge Summary      Date of Admission:  9/12/2023  Date of Discharge:  9/16/2023  Discharging Provider: ABBEY SHARMA MD  Discharge Service: Hospitalist Service, GOLD TEAM 16    Discharge Diagnoses   #Goals of care  #Chills, abdominal pain, weakness:   #Hx of biliary Obstruction  #Acute hypoxic respiratory failure  #Sepsis due Klebsiella pneumonia bacteremia  #Klebsiella pneumonia bacteremia  #Staphylococcus hemolyticus bacteremia,-likely contaminant  #Acute on chronic bilateral pleural effusion  #Anemia  #Thrombocytopenia  #Leukocytosis-resolved  #Hypokalemia  #Hyponatremia  #Elevated lactic acid  #Anorexia  #Metastatic high-grade, but well-differentiated neuroendocrine tumor of the pancreas:   #Hx of cured Hodgkin's disease  #Cognitive impairment    Clinically Significant Risk Factors     # Severe Malnutrition: based on nutrition assessment      Follow-ups Needed After Discharge   Follow-up Appointments     Adult [facility] Follow-up and recommended labs and tests      Follow up with primary care provider, Carmen Cho, within 7   days for hospital follow- up.  No follow up labs or test are needed.      Home hospice referral has also been sent.    Appointments on [place] and/or [facility] (with [facility] or [facility]   provider or service). Call [phone] if you haven't heard regarding   these appointments within 7 days of discharge.            Unresulted Labs Ordered in the Past 30 Days of this Admission       Date and Time Order Name Status Description    9/14/2023 11:00 AM Blood Culture Hand, Left Preliminary     9/14/2023 11:00 AM Blood Culture Arm, Left Preliminary     9/13/2023  1:28 PM Anaerobic bacterial culture Preliminary     9/13/2023  1:28 PM Pleural fluid Aerobic Bacterial Culture Routine with Gram Stain Preliminary     9/13/2023  6:49 AM Blood Culture Hand, Left Preliminary     9/13/2023  6:49 AM Blood Culture Hand,  Left Preliminary         These results will be followed up by outpatient providers.    Discharge Disposition   Discharged to home  Condition at discharge: Good    Hospital Course   Tyrel Harrell is a 69 year old male admitted on 9/12/2023. He has PMH of pancreatic neuroendocrine tumor on active treatment, Hodgkin's  Lymphoma, orthostatic Hypotension, HFrEF, mitral regurgitation s/p Mitraclip (4/22/23), paroxysmal atrial fibrillation, Hx of PE and Biliary obstruction s/p CBD stent placement and acute cholangitis (admission  8/1-8/5) who presents to the ED for evaluation of chills, weakness and abdominal pain.  Work-up on hospitalization was notable for Klebsiella pneumonia bacteremia.  Patient was initially managed on IV Zosyn but was transitioned to p.o. ciprofloxacin following discussion with infectious disease for suppressive therapy given recurrence of his Klebsiella pneumonia bacteremia.  The source of bacteremia likely intra-abdominal, but given recent hospital admission for acute cholangitis (from 8/1 - 8/5) where he did receive CBD stent placement.  Patient will need to obtain EKG in 2 to 3 weeks to monitor his QTc while he is on ciprofloxacin.   He was also noted to have hypoxia on admission, he was subsequently weaned off oxygen.  Patient did undergo bilateral thoracentesis to be about 3.0 L of fluid removed with fluid analysis consistent with transudative effusion.   Given patient's metastatic cancer, coupled with its poor prognosis, patient and family opted for comfort measures on discharge.  Patient will be discharged home on hospice.     #Chills, abdominal pain, weakness:   #Hx of biliary Obstruction  #AHRF  #Sepsis due Klebsiella pneumonia bacteremia  #Klebsiella pneumonia  #Gram +ve bacteremia   - Blood cx now growing gram negative bacilli bacteremia x2/2 sets likely suggestive of klebsiella pneumniae   Discontinue Zosyn and start ciprofloxacin 500 mg twice daily.    Discussed case with infectious  disease, plan to switch patient to ciprofloxacin 500 mg twice daily; recommend continuing this indefinitely, also obtain EKG in a few weeks.     #Goals of Care  --Discussion was had with patient's sons Bird & Angel; they expressed the fact that their dad's overall clinical state is terminal; they have decided as a family, in conjunction with patient that less aggressive approach to care will be pursued going forward. Family will like to limit and invasive procedures and are opened to the possibility suppressive abx therapy rather than pursue ERCP.   --They'll like to focus on comfort directed therapy and willing to continue this discussion with our palliative care colleagues.      ##Chronic Bilateral pleural Effusions:   ##HFrEF  Presents since at least 7/2023 on available imaging. Family reports that patient has received thoracentesis previously, though 2 years ago. CXR with moderate to large bilateral pleural effusions w/ adjacent hazy opacities representing atelectasis/ma vs infection. Pleural effusion similar to size compared to 8/1/23.   -- s/p thoracentesis on 9/13/23 with 1.5 L removed from each lung cavity.   -- TTE showing reduced EF as well   -- currently on room air     ##Bicytopenia:  On admission, Hgb 9.6, Platelets 107. Likely in setting of chemotherapy. BL Hgb appears 9-10, BL platelets appears .  On 9/13/2023, hemoglobin 6.8.  Patient did receive 1 unit of PRBC with adequate response.        ##Metastatic high-grade, but well-differentiated neuroendocrine tumor of the pancreas:   ##Hx of cured Hodgkin's disease  ##Mild cognitive decline: Tumor at head of pancreas with extensive liver mets. Follows with [facility] Oncology with recent video visit 9/11/23. Tumor stable on most recent imaging. Chemotherapy currently on hold d/t decline in functional status, though will resume in one week, followed by a 3 week break. Palliative care referral and Hospice consult placed. Repeat Imaging in October.  Noted to  have recent hallucinations on Oncology clinic note. Patient not currently experiencing hallucinations on admission.   - Oncology consult placed.  Patient seen and evaluated by them, with plans to continue ongoing infectious work-up.  No role for anti cancer therapy at this time.      ##HX of PE:   -Not Currently on A/c d/t fall concerns.      ##Hypothyroidism: PTA synthroid to be continued.     ##Elevated lactic acid-resolved     ##Paroxysmal Atrial fibrillation  ##S/p mitral valve repair (2022): EKG with sinus tachycardia on admission. PTA metoprolol can be resumed     ##Depression: PTA Effexor can be continued.    Consultations This Hospital Stay   NURSING TO CONSULT FOR VASCULAR ACCESS CARE IP CONSULT  NURSING TO CONSULT FOR VASCULAR ACCESS CARE IP CONSULT  PHYSICAL THERAPY ADULT IP CONSULT  ONCOLOGY ADULT IP CONSULT  PALLIATIVE CARE ADULT IP CONSULT  GI PANCREATICOBILIARY ADULT IP CONSULT  SOCIAL WORK IP CONSULT  SPIRITUAL HEALTH SERVICES IP CONSULT  INTERVENTIONAL RADIOLOGY ADULT/PEDS IP CONSULT  GIP INPATIENT HOSPICE ADULT CONSULT  VASCULAR ACCESS ADULT IP CONSULT  SOCIAL WORK IP CONSULT  INFECTIOUS DISEASE [facility] ADULT IP CONSULT    Code Status   No CPR- Do NOT Intubate    Time Spent on this Encounter   I, ABBEY SHARMA MD, personally saw the patient today and spent greater than 30 minutes discharging this patient.       ABBEY SHARMA MD  [facility] MED SURG  [address] [place] [id number]  Phone: [phone]  Fax: [phone]  ______________________________________________________________________    Physical Exam   Vital Signs: Temp: (!) 96  F (35.6  C) Temp src: Oral BP: (!) 150/82 Pulse: 96   Resp: 14 SpO2: 95 % O2 Device: None (Room air)    Weight: 154 lbs 1.62 oz    General Appearance: Lying comfortably in bed, on room air, in no acute distress of discomfort  HEENT: PERRL: EOMI; moist mucous membrane w/o lesions  Neck: No JVD  Pulmonary: Clear to auscultation  bilaterally, no wheezes or crackles. Decreased breath sounds in the bases.   CVS: Regular rhythm, no murmurs, rubs or gallops  GI: BS (+), soft nontender, no rebound or guarding   Extremities: bilatereal LE edema   Skin: No rashes or lesions  Neurologic: A&O to person, situation; not to time or place         Primary Care Physician   Carmen Cho    Discharge Orders      Hospice Referral      Reason for your hospital stay    #Sepsis due to Klebsiella pneumonia  #End-of-life care.     Activity    Your activity upon discharge: activity as tolerated     Adult [facility] Follow-up and recommended labs and tests    Follow up with primary care provider, Carmen Cho, within 7 days for hospital follow- up.  No follow up labs or test are needed.      Home hospice referral has also been sent.    Appointments on [place] and/or [facility] (with [facility] or [facility] provider or service). Call [phone] if you haven't heard regarding these appointments within 7 days of discharge.     Diet    Follow this diet upon discharge: Orders Placed This Encounter      Snacks/Supplements Adult: Other; Gelatein Plus cherry at L, Strawberry Ensure at B, berry Magic Cup at D; With Meals      Low Fiber Diet       Significant Results and Procedures   Most Recent 3 CBC's:  Recent Labs   Lab Test 09/14/23  0739 09/13/23  0743 09/12/23  1112   WBC 9.1 15.0* 14.9*   HGB 9.5* 6.8* 9.6*   MCV 96 99 102*   PLT 77* 112* 107*     Most Recent 3 BMP's:  Recent Labs   Lab Test 09/14/23  1600 09/14/23  0738 09/13/23  0743 09/12/23  1112   NA  --  137 134* 140   POTASSIUM 4.5 4.6 3.3* 3.4   CHLORIDE  --  103 100 102   CO2  --  23 25 27   BUN  --  17.7 18.0 15.2   CR  --  1.21* 1.10 1.04   ANIONGAP  --  11 9 11   CARLOS  --  8.1* 8.3* 8.5*   GLC  --  97 100* 83     Most Recent 2 LFT's:  Recent Labs   Lab Test 09/14/23  0738 09/13/23  0743   AST 71* 102*   ALT 41 52   ALKPHOS 256* 297*   BILITOTAL 2.2* 2.9*     Most Recent 3 INR's:  Recent  Labs   Lab Test 09/12/23  1112 06/12/23  1321 06/07/23  1625   INR 1.47* 1.72* 1.33*   ,   Results for orders placed or performed during the hospital encounter of 09/12/23   XR Chest 2 Views    Narrative    Exam: XR CHEST 2 VIEWS, 9/12/2023 1:33 PM    Comparison: CT chest 8/1/2023    History: fever, cough    Findings:  AP and lateral views of the chest. The cardiac silhouette is obscured.  Mitral valve clips. Moderate to large bilateral pleural effusions with  adjacent hazy opacities. No pneumothorax. Biliary stent in the right  upper quadrant. No acute osseous abnormality.      Impression    Impression: Moderate to large bilateral pleural effusions with  adjacent hazy opacities representing atelectasis/edema versus  infection. Pleural effusions are likely similar in size compared to  8/1/2023 CT.    I have personally reviewed the examination and initial interpretation  and I agree with the findings.    ISIAH DAVIS DO         SYSTEM ID:  [id number]   US Abdomen Limited (RUQ)    Narrative    EXAMINATION: Limited Abdominal Ultrasound, 9/12/2023 11:16 AM     COMPARISON: Ultrasound 8/1/2023, CT 7/26/2023    HISTORY: Fever and abdominal pain, status post ERCP with stent  placement    FINDINGS:   Fluid: Moderate pleural effusion.    Liver: Incomplete visualization of the left liver lobe secondary to  effusion/intra-abdominal edema and bowel gas obscuration. Slightly  hypoechoic appearance of the visualized portions of the hepatic  parenchyma with prominent portal triads and portal/periportal  hyperechogenicity, some of which appears to be linear. Patient status  post ERCP. Measuring 16.5 cm in craniocaudal dimension. Patient's  known liver metastases not visualized sonographically.    Gallbladder: Moderately distended gallbladder with layering  heterogeneous sludge and punctate hypoechoic foci. No associated  gallbladder wall thickening or pericholecystic fluid. Negative  sonographic Rich's sign.    Bile Ducts: No  significant biliary ductal dilation. Common bile duct  is visualized with metal stent in place, and measures 11 mm.    Pancreas: Not visualized.    Kidney: The right kidney measures 10.6 cm long. There is no  hydronephrosis or hydroureter, no shadowing renal calculi, cystic  lesion or mass.       Impression    IMPRESSION:   1.  Hypoechoic hepatic parenchyma with periportal hyperechogenicity  which can be seen with hepatitis. Given recent history of ERCP, some  of the linear echogenicity in the liver likely reflects expected  pneumobilia.  2.  Distended and sludge-filled gallbladder without signs or symptoms  of acute cholecystitis.  3.  Prominent but nonobstructed, stented CBD.  4.  Nonvisualization of the pancreas.    I have personally reviewed the examination and initial interpretation  and I agree with the findings.    JOEL PEREA MD         SYSTEM ID:  [id number]   US Lower Extremity Venous Duplex Right    Narrative    ULTRASOUND LOWER EXTREMITY VENOUS DUPLEX RIGHT 9/12/2023 11:16 AM    CLINICAL HISTORY: right leg swelling, active malignancy.     COMPARISONS: None available.    REFERRING PROVIDER: KAYLYNN JASSO    TECHNIQUE: Grayscale, color Doppler, Doppler waveform ultrasound  evaluation was performed through the right common femoral, femoral,  and popliteal veins. Right posterior tibial and peroneal veins were  evaluated with grayscale imaging and compression.    Left common femoral vein was evaluated for symmetry.    FINDINGS: Left common femoral vein is patent, fully compressible, and  demonstrates normal phasic Doppler waveform.    Right common femoral, femoral, and popliteal veins are fully  compressible, patent, and demonstrate normal phasic Doppler waveforms.    Right posterior tibial veins are fully compressible to the ankle.    Right peroneal veins were not visualized.      Impression    IMPRESSION: Right peroneal veins were not visualized. Otherwise, no  deep venous thrombosis demonstrated  "in the RIGHT leg.    Reference: \"Duplex Ultrasound in the Diagnosis of Lower-Extremity Deep  Venous Thrombosis\"- Rocío Bolton MD, S; Flakito Summers MD  (Circulation. 2014;129:917-921. http://circ.ahajournals.org)    DEANNA PAZ MD         SYSTEM ID:  [id number]   CT Chest/Abdomen/Pelvis w Contrast    Narrative    EXAMINATION: CT CHEST/ABDOMEN/PELVIS W CONTRAST, 9/12/2023 3:33 PM    INDICATION: fever, cough, abdominal pain    COMPARISON STUDY: CT chest radiographs, CT chest abdomen and pelvis  7/26/2023, CT chest PE 8/1/2023    TECHNIQUE: CT scan of the chest, abdomen and pelvis was performed on  multidetector CT scanner using volumetric acquisition technique and  images were reconstructed in multiple planes with variable thickness  and reviewed on dedicated workstations.     CONTRAST: iopamidol (ISOVUE-370) solution 95 mL. Without oral  contrast.    CT scan radiation dose is optimized to minimum requisite dose using  automated dose modulation techniques.    FINDINGS:    Chest:   Mediastinum: Atrophic thyroid Heart size is within normal limits. No  thoracic lymphadenopathy.    Pleura: Slightly increased moderate bilateral pleural effusions. No  pneumothorax.     Lungs: Central tracheobronchial tree is patent. Subsegmental dependant  atelectasis. Mild dependent groundglass opacities.    Abdomen and Pelvis:  Liver: Unchanged hepatic hypodensities. No intrahepatic biliary ductal  dilation. Similar mildly heterogeneous attenuation of the hepatic  parenchyma in the inferior right lobe.    Biliary System: Normal gallbladder. Stable common bile duct stent.  Decreased pneumobilia.    Pancreas: Unchanged heterogeneous ovoid mass at the location of the  head of the pancreas, measuring 4.3 x 3.8 cm on axial views. The mass  abuts the main portal vein and celiac artery without encasement.  Atrophic tail of the pancreas.    Adrenal glands: No mass or nodules    Spleen: Surgically absent..    Kidneys: No suspicious mass, " obstructing calculus or hydronephrosis.  Unchanged scarring and cystic appearance of the inferior pole of the  left kidney. Punctate calcification in the lower left renal collecting  system.    Gastrointestinal tract: Normal caliber small bowel. Multiple redundant  loops of large bowel. Stool burden throughout the large bowel. The  appendix is surgically absent.     Pelvis: Urinary bladder is normal. Calcifications in the prostate.     Mesentery/peritoneum/retroperitoneum: No mass. No free fluid or air.    Lymph nodes: No significant lymphadenopathy.    Vasculature: Atherosclerotic calcification of abdominal aorta with no  aneurysmal dilation.     Soft tissues: Mild diffuse subcutaneous anasarca.    Osseous structures: No aggressive or acute osseous lesion. Stable  degenerative changes of the lumbar spine with severe disc height loss  and stepwise grade 1 retrolisthesis at L3-4 and L4-5.      Impression    IMPRESSION:   1.  Slightly increased moderate bilateral pleural effusions.  2.  Stable position of common bile duct stent with decreased  pneumobilia.  3.  No significant change in the heterogeneous pancreatic head mass  consistent with known neuroendocrine tumor and known liver metastases.  4.  Increased stool throughout the large bowel.  5.  Punctate left renal calculus without obstruction.    I have personally reviewed the examination and initial interpretation  and I agree with the findings.    SHAMA CARO MD         SYSTEM ID:  [id number]   Echo Complete     Value    Biplane LVEF 37%    Narrative    [id number]  [id number]  [id number]  [id number]^JOSE^MARVEL^CIERA     [facility]  Echocardi[facility]  [address] [place] [id number]     Name: MARTÍNEZ DENNISON  MRN: [id number]  : 1953  Study Date: 2023 10:17 AM  Age: 69 yrs  Gender: Male  Patient Location: [facility]  Reason For Study: CHF  Ordering Physician: MARVEL GARCIA  Performed By: Maryan  Mannie SYLVAINDESTINEE     BSA: 1.8 m2  Height: 68 in  Weight: 154 lb  ______________________________________________________________________________  Procedure  Complete Portable Echo Adult.  ______________________________________________________________________________  Interpretation Summary  Biplane LVEF is 37%.  Left ventricular size is normal.  Right ventricular function, chamber size, wall motion, and thickness are  normal.  S/P MitraClip. Mean mitral gradient 6mmHg.Heart rate 95BPM. Moderate residual  MR.  Mild aortic insufficiency is present.  Mild to moderate tricuspid insufficiency is present.  Right ventricular systolic pressure is 50mmHg above the right atrial pressure.  IVC diameter >2.1 cm collapsing <50% with sniff suggests a high RA pressure  estimated at 15 mmHg or greater.  No pericardial effusion is present.  ______________________________________________________________________________  Left Ventricle  Biplane LVEF is 37%. Left ventricular wall thickness is normal. Left  ventricular size is normal. Left ventricular diastolic function is  indeterminate. Moderate to severe diffuse hypokinesis is present.     Right Ventricle  Right ventricular function, chamber size, wall motion, and thickness are  normal.     Atria  The atria cannot be assessed.     Mitral Valve  S/P MitraClip. Mean mitral gradient 6mmHg.Heart rate 95BPM. Moderate residual  MR.     Aortic Valve  Aortic valve sclerosis is present. Mild aortic insufficiency is present.     Tricuspid Valve  Mild to moderate tricuspid insufficiency is present. Right ventricular  systolic pressure is 50mmHg above the right atrial pressure.     Pulmonic Valve  The pulmonic valve is normal.     Vessels  The thoracic aorta cannot be assessed. The pulmonary artery cannot be  assessed. IVC diameter >2.1 cm collapsing <50% with sniff suggests a high RA  pressure estimated at 15 mmHg or greater.     Pericardium  No pericardial effusion is  present.  ______________________________________________________________________________  MMode/2D Measurements & Calculations  IVSd: 0.92 cm     LVIDd: 4.8 cm  LVIDs: 4.0 cm  LVPWd: 0.70 cm  FS: 16.7 %  LV mass(C)d: 131.1 grams  LV mass(C)dI: 71.7 grams/m2  RWT: 0.29     Doppler Measurements & Calculations  MV max P.8 mmHg  MV mean P.0 mmHg  MV V2 VTI: 30.6 cm  TR max woody: 307.7 cm/sec  TR max P.3 mmHg     ______________________________________________________________________________  Report approved by: Alanna Balderrama 2023 01:31 PM             Discharge Medications   Current Discharge Medication List        START taking these medications    Details   ciprofloxacin (CIPRO) 500 MG tablet Take 1 tablet (500 mg) by mouth every 12 hours  Qty: 60 tablet, Refills: 0    Associated Diagnoses: Sepsis, due to unspecified organism, unspecified whether acute organ dysfunction present (H)           CONTINUE these medications which have NOT CHANGED    Details   acetaminophen (TYLENOL) 500 MG tablet Take 500-1,000 mg by mouth every 8 hours as needed for mild pain      calcium carbonate 750 MG CHEW Take 750 mg by mouth At Bedtime      dapagliflozin (FARXIGA) 10 MG TABS tablet Take 1 tablet by mouth daily      emollient (VANICREAM) external cream Apply topically daily as needed for other (rash on hands and feet)      EPINEPHrine (ANY BX GENERIC EQUIV) 0.3 MG/0.3ML injection 2-pack Inject 0.3 mLs (0.3 mg) into the muscle as needed for anaphylaxis May repeat one time in 5-15 minutes if response to initial dose is inadequate.  Qty: 2 each, Refills: 0    Associated Diagnoses: Bee allergy status      furosemide (LASIX) 20 MG tablet Take 20 mg by mouth as needed      levothyroxine (SYNTHROID/LEVOTHROID) 112 MCG tablet Take 1 tablet (112 mcg) by mouth daily  Qty: 90 tablet, Refills: 0    Associated Diagnoses: Acquired hypothyroidism      lipase-protease-amylase (CREON 12) 86673-380[address]-22[address] units CPEP Take 2-3  capsules by mouth 3 times daily (with meals)      lovastatin (MEVACOR) 40 MG tablet Take 1 tablet (40 mg) by mouth At Bedtime  Qty: 90 tablet, Refills: 3    Associated Diagnoses: Mixed hyperlipidemia      melatonin 3 MG tablet Take 3 mg by mouth nightly as needed for sleep      metoprolol succinate ER (TOPROL XL) 25 MG 24 hr tablet Take 12.5 mg by mouth 2 times daily      Multiple Vitamin (MULTIVITAMIN) per tablet Take 1 tablet by mouth 2 times daily      ondansetron (ZOFRAN ODT) 8 MG ODT tab Take 1 tablet (8 mg) by mouth every 8 hours as needed for nausea  Qty: 30 tablet, Refills: 1    Associated Diagnoses: Malignant neoplasm of endocrine pancreas (H); Chemotherapy-induced nausea      polyethylene glycol 3350 POWD Take 1 packet by mouth as needed      prochlorperazine (COMPAZINE) 10 MG tablet Take 10 mg by mouth every 6 hours as needed for nausea      !! venlafaxine (EFFEXOR XR) 150 MG 24 hr capsule Take 150 mg by mouth every morning With 75 mg capsule      !! venlafaxine (EFFEXOR XR) 75 MG 24 hr capsule Take 75 mg by mouth every morning With 150 mg capsule      vitamin D3 (CHOLECALCIFEROL) 50 mcg (2000 units) tablet Take 1 tablet by mouth daily       !! - Potential duplicate medications found. Please discuss with provider.        Allergies   Allergies   Allergen Reactions    Bee Venom Hives and Other (See Comments)     Other reaction(s): Other (see comments)  Not honey bees. Unknown bee  Per pt  Per pt  Per pt  Per pt      Simvastatin Muscle Pain (Myalgia) and Other (See Comments)     Other reaction(s): Muscle Aches, Other (see comments)  Patient reports muscle stiffness  Patient reports muscle stiffness  myalgias  myalgias      Vinblastine Other (See Comments)     Other reaction(s): Other (see comments)  Patient reported paralyzation of colon  Per pt  Patient reported paralyzation of colon  Per pt      Vincristine Other (See Comments)     Patient reports paralyzation of his colon  Patient reports paralyzation of  his colon  Patient reports paralyzation of his colon  Patient reports paralyzation of his colon      Lina-Kit Bee Sting     Nkda [No Known Drug Allergy]

## 2023-09-19 NOTE — ED ADULT NURSE NOTE - NS ED NURSE DC TEACHING
[facility]  Discharge Summary      Patient Name: Jay Stroud  MRN: [id number]  ALINA: [id number]  Patient Class: IP- Inpatient  Admission Date: 9/18/2023  Hospital Length of Stay: 1 days  Discharge Date and Time:  09/19/2023 12:48 PM  Attending Physician: Sincere Alonso MD   Discharging Provider: Sincere Alonso MD  Primary Care Provider: Elsi Emerson MD    Primary Care Team: Networked reference to record PCT     HPI:   65 y/o male with history of HTN, CAD, HLD, and LE stent placement via angiogram 9/14/2023 presented to the ED for left sided groin pain and one episode of SOB and dizziness. Patient states that after his procedure 4 days ago they had trouble getting hemostasis in the right groin however 2 days ago he started to notice bruising and pain in his left groin. Patient states that he is also having left sided back pain that is more intense than the back pain he typically experiences. After having trouble sleeping, the patient states that he went to take a shower this morning and started to feel lightheaded, had SOB, and felt very weak. He was seen by his cardiologist today and they sent him to the ED due to a HR in the 130s-140s. Patient denies any nausea, vomiting, abdominal pain, or bleeding. His last bowel movement was 2 days ago. Patient denies any SOB, chest pain, or dizziness at this time. Code status was discussed and patient was placed as a full code at this time.     ED: Vitals showed , /96, RR 20 saturating at 100% on RA. Labs were significant for potassium of 3.4 and WBC count of 13.06. US of the LLE showed no evidence of hematoma or pseudoaneurysm at the left groin.CT abdomen pelvis showed  no acute process is identified within the abdomen or pelvis. CTA chest showed tiny nonobstructing thrombus in a segmental branch of the left upper lobe (anterior segment).      * No surgery found *      Hospital Course:   Pt is a 65 y/o M with hx  of HTN, PAD s/p angiogram with cardiology on 9/14, subsequently he developed pain and swelling in his leg and then in his back. He felt SOB and dizzy and went to his cardiologists office and was ultimately sent to the ED. He was found to have a left upper lobe segmental pulmonary embolus. He had no signs of bleeding or hematoma / pseudoaneurysm. Venous ultrasound of his legs did not show any venous thrombosis. He has no chest pain or SOB. His vital signs are normal. He was started on lovenox and remained stable. He was laying around at home for several days after his procedure prior to presentation. This may have been the etiology of his thrombosis and ultimate PE. However, it is somewhat unusual to have a PE after a procedure with mild sedation and moderately limited mobility afterward. I have referred him to hematology for further evaluation as he may have an underlying disorder giving him propensity to form clots. He will start Eliquis with thromboembolism loading dose and treatment dosing for a minimum of 90 days. He will follow up with Dr Weston's office within the next 2-5 days, and with his pcp in 7 days. I have seen and evaluated him on the day of discharge and reviewed the discharge instructions with him. He was discharged in good condition with plans for close outpatient follow up.        Goals of Care Treatment Preferences:  Code Status: Full Code      Consults:   Consults (From admission, onward)        Status Ordering Provider     Inpatient consult to Vascular Surgery  Once        Provider:  Olu Mortensen MD    Completed KATE KNIGHT          No new Assessment & Plan notes have been filed under this hospital service since the last note was generated.  Service: Hospital Medicine    Final Active Diagnoses:    Diagnosis Date Noted POA    PRINCIPAL PROBLEM:  Acute pulmonary embolism [I26.99] 09/18/2023 Yes    Anxiety [F41.9] 09/18/2023 Yes    Good hypertension control [I10] 01/27/2021 Yes       Problems Resolved During this Admission:       Discharged Condition: good    Disposition: Home or Self Care    Follow Up:   Follow-up Information     Elsi Emerson MD. Schedule an appointment as soon as possible for a visit in 1 week(s).    Specialty: Internal Medicine  Contact information:  [address]  [address]  [place] [address]  [phone]             Jorge Weston MD. Schedule an appointment as soon as possible for a visit in 2 day(s).    Specialties: Cardiology, Interventional Cardiology  Contact information:  [address]  [place] [address]  [phone]             Chuy Rucker MD. Schedule an appointment as soon as possible for a visit in 2 week(s).    Specialties: Hematology, Hematology and Oncology, Oncology  Contact information:  [address]  [address]  [place] [address]  [phone]                       Patient Instructions:      Ambulatory referral/consult to Hematology / Oncology   Standing Status: Future   Referral Priority: Routine Referral Type: Consultation   Referral Reason: Specialty Services Required   Referred to Provider: CHUY RUCKER Requested Specialty: Hematology and Oncology   Number of Visits Requested: 1     Diet Adult Regular     No dressing needed     Activity as tolerated       Significant Diagnostic Studies: Labs:   BMP:   Recent Labs   Lab 09/18/23  1249 09/19/23  0435   GLU 99 151*    137   K 3.4* 4.3    109   CO2 28 23   BUN 15 16   CREATININE 1.3 1.2   CALCIUM 9.3 8.6*   MG 2.2  --    , CBC   Recent Labs   Lab 09/18/23  1249 09/19/23  0435   WBC 13.06* 10.09   HGB 15.6 13.3*   HCT 46.3 39.4*    247    and All labs within the past 24 hours have been reviewed    Pending Diagnostic Studies:     Procedure Component Value Units Date/Time    Lactic acid, plasma [[id number]] Collected: 09/18/23 1636    Order Status: Sent Lab Status: In process Updated: 09/18/23 1637    Specimen: Blood           Medications:  Reconciled Home Medications:      Medication List      START taking these medications    apixaban 5 mg Tab  Commonly known as: ELIQUIS  Take 2 tablets (10 mg total) by mouth 2 (two) times daily for 7 days, THEN 1 tablet (5 mg total) 2 (two) times daily.  Start taking on: September 19, 2023     HYDROcodone-acetaminophen 5-325 mg per tablet  Commonly known as: NORCO  Take 1 tablet by mouth every 6 (six) hours as needed for Pain.        CONTINUE taking these medications    clopidogreL 75 mg tablet  Commonly known as: PLAVIX  Take 75 mg by mouth once daily.     cyanocobalamin 1,000 mcg/mL injection  Inject 1,000 mcg into the muscle every 7 days.     diclofenac sodium 1 % Gel  Commonly known as: VOLTAREN  Apply 2 g topically once daily.     fluticasone propionate 50 mcg/actuation nasal spray  Commonly known as: FLONASE  1 spray by Each Nostril route once daily.     losartan 50 MG tablet  Commonly known as: COZAAR  Take 1 tablet (50 mg total) by mouth once daily.     sucralfate 1 gram tablet  Commonly known as: CARAFATE  Take 1 tablet by mouth 4 (four) times daily.     tamsulosin 0.4 mg Cap  Commonly known as: FLOMAX  Take 1 capsule (0.4 mg total) by mouth once daily.     traMADoL 50 mg tablet  Commonly known as: ULTRAM  Take 1 tablet by mouth 2 (two) times daily as needed.        STOP taking these medications    fish oil-omega-3 fatty acids 300-1,000 mg capsule     furosemide 20 MG tablet  Commonly known as: LASIX     varenicline 0.5 mg (11)- 1 mg (42) tablet  Commonly known as: CHANTIX TOÑO     VITAMIN C 1000 MG tablet  Generic drug: ascorbic acid (vitamin C)     vitamin D 1000 units Tab  Commonly known as: VITAMIN D3            Indwelling Lines/Drains at time of discharge:   Lines/Drains/Airways     None                 Time spent on the discharge of patient: 50 minutes         Sincere Alonso MD  Department of Hospital Medicine  [facility]   Orthopedic

## 2023-12-24 NOTE — ED PROVIDER NOTE - EXTREMITY EXAM
[facility] Plan of Care Note  Dx Thrombosis of femoro-popliteal bypass graft    Shift Events none    Goals of Care: pain management,     Neuro: AAO X 4    Vital Signs: VSS    Respiratory: 2L    Diet: reg     Is patient tolerating current diet? yes    GTTS: Heparin @ 15.5     Urine Output/Bowel Movement: adequate urine output with marie catheter    Drains/Tubes/Tube Feeds (include total output/shift): none    Lines: R upper arm 20g / R FA 20/ L hand 20g      Accuchecks:none    Skin: incision to R groin     Fall Risk Score: 18    Activity level? Up with assist X 1    Any scheduled procedures? Possible surgery    Any safety concerns? Fall precautions/ bleeding precautions    Other: none   right lower extremity findings

## 2024-07-20 NOTE — ED ADULT NURSE NOTE - EYE APPEARANCE
Problem: Safety - Adult  Goal: Free from fall injury  Outcome: Progressing     Problem: Discharge Planning  Goal: Discharge to home or other facility with appropriate resources  Outcome: Progressing  Flowsheets (Taken 7/20/2024 5236)  Discharge to home or other facility with appropriate resources:   Identify barriers to discharge with patient and caregiver   Arrange for needed discharge resources and transportation as appropriate   Identify discharge learning needs (meds, wound care, etc)   Refer to discharge planning if patient needs post-hospital services based on physician order or complex needs related to functional status, cognitive ability or social support system     Problem: Pain  Goal: Verbalizes/displays adequate comfort level or baseline comfort level  Outcome: Progressing     Problem: Skin/Tissue Integrity  Goal: Absence of new skin breakdown  Description: 1.  Monitor for areas of redness and/or skin breakdown  2.  Assess vascular access sites hourly  3.  Every 4-6 hours minimum:  Change oxygen saturation probe site  4.  Every 4-6 hours:  If on nasal continuous positive airway pressure, respiratory therapy assess nares and determine need for appliance change or resting period.  Outcome: Progressing     Problem: ABCDS Injury Assessment  Goal: Absence of physical injury  Outcome: Progressing      blind spot/visual disturbance

## 2025-07-29 NOTE — ED ADULT NURSE NOTE - NS ED NURSE LEVEL OF CONSCIOUSNESS SPEECH
Patients son Karthikeyan left a message about medications and would like a call back [phone]   Speaking Coherently